# Patient Record
Sex: FEMALE | Race: OTHER | URBAN - METROPOLITAN AREA
[De-identification: names, ages, dates, MRNs, and addresses within clinical notes are randomized per-mention and may not be internally consistent; named-entity substitution may affect disease eponyms.]

---

## 2018-10-01 ENCOUNTER — EMERGENCY (EMERGENCY)
Facility: HOSPITAL | Age: 38
LOS: 0 days | Discharge: HOME | End: 2018-10-01
Attending: EMERGENCY MEDICINE | Admitting: EMERGENCY MEDICINE

## 2018-10-01 VITALS — WEIGHT: 160.06 LBS | HEIGHT: 62 IN

## 2018-10-01 VITALS
TEMPERATURE: 97 F | DIASTOLIC BLOOD PRESSURE: 61 MMHG | SYSTOLIC BLOOD PRESSURE: 131 MMHG | OXYGEN SATURATION: 100 % | HEART RATE: 86 BPM | RESPIRATION RATE: 19 BRPM

## 2018-10-01 DIAGNOSIS — R51 HEADACHE: ICD-10-CM

## 2018-10-01 DIAGNOSIS — D64.9 ANEMIA, UNSPECIFIED: ICD-10-CM

## 2018-10-01 LAB
ALBUMIN SERPL ELPH-MCNC: 4.5 G/DL — SIGNIFICANT CHANGE UP (ref 3.5–5.2)
ALP SERPL-CCNC: 70 U/L — SIGNIFICANT CHANGE UP (ref 30–115)
ALT FLD-CCNC: 11 U/L — SIGNIFICANT CHANGE UP (ref 0–41)
ANION GAP SERPL CALC-SCNC: 15 MMOL/L — HIGH (ref 7–14)
AST SERPL-CCNC: 15 U/L — SIGNIFICANT CHANGE UP (ref 0–41)
BASOPHILS # BLD AUTO: 0.06 K/UL — SIGNIFICANT CHANGE UP (ref 0–0.2)
BASOPHILS NFR BLD AUTO: 0.8 % — SIGNIFICANT CHANGE UP (ref 0–1)
BILIRUB SERPL-MCNC: 0.4 MG/DL — SIGNIFICANT CHANGE UP (ref 0.2–1.2)
BUN SERPL-MCNC: 11 MG/DL — SIGNIFICANT CHANGE UP (ref 10–20)
CALCIUM SERPL-MCNC: 9.2 MG/DL — SIGNIFICANT CHANGE UP (ref 8.5–10.1)
CHLORIDE SERPL-SCNC: 100 MMOL/L — SIGNIFICANT CHANGE UP (ref 98–110)
CO2 SERPL-SCNC: 24 MMOL/L — SIGNIFICANT CHANGE UP (ref 17–32)
CREAT SERPL-MCNC: 0.6 MG/DL — LOW (ref 0.7–1.5)
EOSINOPHIL # BLD AUTO: 0.07 K/UL — SIGNIFICANT CHANGE UP (ref 0–0.7)
EOSINOPHIL NFR BLD AUTO: 0.9 % — SIGNIFICANT CHANGE UP (ref 0–8)
GLUCOSE SERPL-MCNC: 89 MG/DL — SIGNIFICANT CHANGE UP (ref 70–99)
HCT VFR BLD CALC: 27.2 % — LOW (ref 37–47)
HGB BLD-MCNC: 7.5 G/DL — LOW (ref 12–16)
IMM GRANULOCYTES NFR BLD AUTO: 0.3 % — SIGNIFICANT CHANGE UP (ref 0.1–0.3)
LYMPHOCYTES # BLD AUTO: 2.99 K/UL — SIGNIFICANT CHANGE UP (ref 1.2–3.4)
LYMPHOCYTES # BLD AUTO: 39.2 % — SIGNIFICANT CHANGE UP (ref 20.5–51.1)
MCHC RBC-ENTMCNC: 18.9 PG — LOW (ref 27–31)
MCHC RBC-ENTMCNC: 27.6 G/DL — LOW (ref 32–37)
MCV RBC AUTO: 68.7 FL — LOW (ref 81–99)
MONOCYTES # BLD AUTO: 0.44 K/UL — SIGNIFICANT CHANGE UP (ref 0.1–0.6)
MONOCYTES NFR BLD AUTO: 5.8 % — SIGNIFICANT CHANGE UP (ref 1.7–9.3)
NEUTROPHILS # BLD AUTO: 4.05 K/UL — SIGNIFICANT CHANGE UP (ref 1.4–6.5)
NEUTROPHILS NFR BLD AUTO: 53 % — SIGNIFICANT CHANGE UP (ref 42.2–75.2)
NRBC # BLD: 0 /100 WBCS — SIGNIFICANT CHANGE UP (ref 0–0)
PLATELET # BLD AUTO: 597 K/UL — HIGH (ref 130–400)
POTASSIUM SERPL-MCNC: 4.1 MMOL/L — SIGNIFICANT CHANGE UP (ref 3.5–5)
POTASSIUM SERPL-SCNC: 4.1 MMOL/L — SIGNIFICANT CHANGE UP (ref 3.5–5)
PROT SERPL-MCNC: 7.3 G/DL — SIGNIFICANT CHANGE UP (ref 6–8)
RBC # BLD: 3.96 M/UL — LOW (ref 4.2–5.4)
RBC # FLD: 19.4 % — HIGH (ref 11.5–14.5)
SODIUM SERPL-SCNC: 139 MMOL/L — SIGNIFICANT CHANGE UP (ref 135–146)
WBC # BLD: 7.63 K/UL — SIGNIFICANT CHANGE UP (ref 4.8–10.8)
WBC # FLD AUTO: 7.63 K/UL — SIGNIFICANT CHANGE UP (ref 4.8–10.8)

## 2018-10-01 RX ORDER — METOCLOPRAMIDE HCL 10 MG
10 TABLET ORAL ONCE
Qty: 0 | Refills: 0 | Status: COMPLETED | OUTPATIENT
Start: 2018-10-01 | End: 2018-10-01

## 2018-10-01 RX ORDER — SODIUM CHLORIDE 9 MG/ML
1000 INJECTION, SOLUTION INTRAVENOUS ONCE
Qty: 0 | Refills: 0 | Status: COMPLETED | OUTPATIENT
Start: 2018-10-01 | End: 2018-10-01

## 2018-10-01 RX ADMIN — Medication 10 MILLIGRAM(S): at 10:03

## 2018-10-01 RX ADMIN — SODIUM CHLORIDE 2000 MILLILITER(S): 9 INJECTION, SOLUTION INTRAVENOUS at 10:03

## 2018-10-01 NOTE — ED PROVIDER NOTE - NS ED ROS FT
General: No fever/No chills +weakness  Eyes:  No visual changes, eye pain or discharge.  ENMT:  No hearing changes, pain, no sore throat or runny nose, no difficulty swallowing  Cardiac:  No chest pain, SOB or edema. No chest pain with exertion.  Respiratory:  No cough or respiratory distress. No hemoptysis. No history of asthma or RAD.  GI:  + nausea, - vomiting, diarrhea or abdominal pain.  :  No dysuria, frequency or burning.  MS:  No myalgia, muscle weakness, joint pain or back pain.  Neuro:  +headache or weakness.  No LOC.  Skin:  No skin rash.   Endocrine: No history of thyroid disease or diabetes.

## 2018-10-01 NOTE — ED ADULT NURSE NOTE - NSIMPLEMENTINTERV_GEN_ALL_ED
Implemented All Universal Safety Interventions:  [place] to call system. Call bell, personal items and telephone within reach. Instruct patient to call for assistance. Room bathroom lighting operational. Non-slip footwear when patient is off stretcher. Physically safe environment: no spills, clutter or unnecessary equipment. Stretcher in lowest position, wheels locked, appropriate side rails in place.

## 2018-10-01 NOTE — ED PROVIDER NOTE - PHYSICAL EXAMINATION
CONSTITUTIONAL: Well-developed; well-nourished; in no acute distress.   SKIN: warm, dry  HEAD: Normocephalic; atraumatic.  EYES: PERRL, EOMI, no conjunctival erythema, mucous membranes moist  ENT: No nasal discharge; airway clear  NECK: Supple; non tender.  CARD: S1, S2 normal; no murmurs, gallops, or rubs. Regular rate and rhythm.   RESP: No wheezes, rales or rhonchi.  ABD: soft ntnd  EXT: Normal ROM.  No clubbing, cyanosis or edema. .  NEURO: Alert, oriented, grossly unremarkable  PSYCH: Cooperative, appropriate.

## 2018-10-01 NOTE — ED PROVIDER NOTE - MEDICAL DECISION MAKING DETAILS
37 female with symptomatic anemia with headache likely nutritional cause due to prior surgery / lack of support will dispo to [facility] for transfusion and continued care 37 female with symptomatic anemia with headache likely nutritional cause due to prior surgery / lack of support will dispo to [facility] for transfusion and continued care.

## 2018-10-01 NOTE — ED PROVIDER NOTE - ATTENDING CONTRIBUTION TO CARE
I personally evaluated the patient. I reviewed the Resident’s or Physician Assistant’s note (as assigned above), and agree with the findings and plan except as documented in my note.     37 female here for headache x 2 days described as throbbing, worse with exertion, associated with fatigue, non resolving. Similar to prior when she was anemic.     PMH: anemia on no Rx / supplements  PSH: gastric bypass 10 years ago Dr. Keith with limited follow up    PE: female in no distress. HEENT: pale conjunctiva normal mucosa. CHEST: CTA bilateral. CV: pulses intact. SKIN: normal. NEURO: AAO 3 no focal deficits gait speech memory coordination normal.     Impression: anemia, headache    Plan: IV labs imaging supportive care and re-evaluation likely dispo to [facility].

## 2018-10-01 NOTE — ED PROVIDER NOTE - OBJECTIVE STATEMENT
37 y.o. F PMH s/p gastric bypass with chronic anemia 9y ago with 1 week of fatigue and 2 days of headache. She describes this headache as a throbbing headache diffusely, she feels this way when she has anemia. +headache/+fatigue/+N/-V/-F/-D/-dysuria/-vaginal bleeding. She usually receives Iron injections by Dr. Benoit but has not seen him in a few months.

## 2018-10-01 NOTE — ED PROVIDER NOTE - PROGRESS NOTE DETAILS
case d/w Dr. Del Rio Discussed Hemoglobin and the need for observation and transfusion. Pt refused transfusion and will follow up with her Hemotologist for iron tranfusion. Pt understands the risks and return precautions were given. Will Discharge. patient prefers outpatient care has follow up with Dr. Paredes for iron transfusions. Does not want to stay for transfusion. Understands the risks and benefits of her decision. 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 Has a plan of care.

## 2018-10-01 NOTE — ED ADULT NURSE NOTE - OBJECTIVE STATEMENT
Patient c/o Generalized HA with 1 episode of N/V x 4 day . Patient denies CP SOB visual changes fevers and chills.

## 2019-03-09 ENCOUNTER — INPATIENT (INPATIENT)
Facility: HOSPITAL | Age: 39
LOS: 2 days | Discharge: HOME | End: 2019-03-12
Attending: INTERNAL MEDICINE | Admitting: INTERNAL MEDICINE

## 2019-03-09 VITALS
HEIGHT: 62 IN | SYSTOLIC BLOOD PRESSURE: 128 MMHG | TEMPERATURE: 98 F | OXYGEN SATURATION: 100 % | WEIGHT: 154.98 LBS | HEART RATE: 109 BPM | RESPIRATION RATE: 20 BRPM | DIASTOLIC BLOOD PRESSURE: 73 MMHG

## 2019-03-09 PROBLEM — Z98.84 BARIATRIC SURGERY STATUS: Chronic | Status: ACTIVE | Noted: 2018-10-01

## 2019-03-09 PROBLEM — D64.9 ANEMIA, UNSPECIFIED: Chronic | Status: ACTIVE | Noted: 2018-10-01

## 2019-03-09 LAB
ABO RH CONFIRMATION: SIGNIFICANT CHANGE UP
ALBUMIN SERPL ELPH-MCNC: 3.7 G/DL — SIGNIFICANT CHANGE UP (ref 3.5–5.2)
ALP SERPL-CCNC: 85 U/L — SIGNIFICANT CHANGE UP (ref 30–115)
ALT FLD-CCNC: 38 U/L — SIGNIFICANT CHANGE UP (ref 0–41)
ANION GAP SERPL CALC-SCNC: 12 MMOL/L — SIGNIFICANT CHANGE UP (ref 7–14)
APTT BLD: 26.7 SEC — LOW (ref 27–39.2)
AST SERPL-CCNC: 19 U/L — SIGNIFICANT CHANGE UP (ref 0–41)
BILIRUB SERPL-MCNC: 0.2 MG/DL — SIGNIFICANT CHANGE UP (ref 0.2–1.2)
BLD GP AB SCN SERPL QL: SIGNIFICANT CHANGE UP
BUN SERPL-MCNC: 15 MG/DL — SIGNIFICANT CHANGE UP (ref 10–20)
CALCIUM SERPL-MCNC: 8.7 MG/DL — SIGNIFICANT CHANGE UP (ref 8.5–10.1)
CHLORIDE SERPL-SCNC: 106 MMOL/L — SIGNIFICANT CHANGE UP (ref 98–110)
CO2 SERPL-SCNC: 22 MMOL/L — SIGNIFICANT CHANGE UP (ref 17–32)
CREAT SERPL-MCNC: 0.5 MG/DL — LOW (ref 0.7–1.5)
GLUCOSE SERPL-MCNC: 100 MG/DL — HIGH (ref 70–99)
HCT VFR BLD CALC: 26.7 % — LOW (ref 37–47)
HGB BLD-MCNC: 8.2 G/DL — LOW (ref 12–16)
INR BLD: 1.14 RATIO — SIGNIFICANT CHANGE UP (ref 0.65–1.3)
LACTATE SERPL-SCNC: 1.1 MMOL/L — SIGNIFICANT CHANGE UP (ref 0.5–2.2)
MCHC RBC-ENTMCNC: 24.9 PG — LOW (ref 27–31)
MCHC RBC-ENTMCNC: 30.7 G/DL — LOW (ref 32–37)
MCV RBC AUTO: 81.2 FL — SIGNIFICANT CHANGE UP (ref 81–99)
NRBC # BLD: 0 /100 WBCS — SIGNIFICANT CHANGE UP (ref 0–0)
PLATELET # BLD AUTO: 311 K/UL — SIGNIFICANT CHANGE UP (ref 130–400)
POTASSIUM SERPL-MCNC: 4.4 MMOL/L — SIGNIFICANT CHANGE UP (ref 3.5–5)
POTASSIUM SERPL-SCNC: 4.4 MMOL/L — SIGNIFICANT CHANGE UP (ref 3.5–5)
PROT SERPL-MCNC: 6.5 G/DL — SIGNIFICANT CHANGE UP (ref 6–8)
PROTHROM AB SERPL-ACNC: 13.1 SEC — HIGH (ref 9.95–12.87)
RBC # BLD: 3.29 M/UL — LOW (ref 4.2–5.4)
RBC # FLD: 18.2 % — HIGH (ref 11.5–14.5)
SODIUM SERPL-SCNC: 140 MMOL/L — SIGNIFICANT CHANGE UP (ref 135–146)
TYPE + AB SCN PNL BLD: SIGNIFICANT CHANGE UP
WBC # BLD: 9.17 K/UL — SIGNIFICANT CHANGE UP (ref 4.8–10.8)
WBC # FLD AUTO: 9.17 K/UL — SIGNIFICANT CHANGE UP (ref 4.8–10.8)

## 2019-03-09 RX ORDER — PANTOPRAZOLE SODIUM 20 MG/1
8 TABLET, DELAYED RELEASE ORAL
Qty: 80 | Refills: 0 | Status: DISCONTINUED | OUTPATIENT
Start: 2019-03-09 | End: 2019-03-12

## 2019-03-09 RX ORDER — METOCLOPRAMIDE HCL 10 MG
10 TABLET ORAL ONCE
Qty: 0 | Refills: 0 | Status: DISCONTINUED | OUTPATIENT
Start: 2019-03-09 | End: 2019-03-09

## 2019-03-09 RX ORDER — ACETAMINOPHEN 500 MG
650 TABLET ORAL ONCE
Qty: 0 | Refills: 0 | Status: DISCONTINUED | OUTPATIENT
Start: 2019-03-09 | End: 2019-03-09

## 2019-03-09 RX ORDER — SODIUM CHLORIDE 9 MG/ML
1000 INJECTION, SOLUTION INTRAVENOUS
Qty: 0 | Refills: 0 | Status: DISCONTINUED | OUTPATIENT
Start: 2019-03-09 | End: 2019-03-10

## 2019-03-09 RX ORDER — PANTOPRAZOLE SODIUM 20 MG/1
80 TABLET, DELAYED RELEASE ORAL ONCE
Qty: 0 | Refills: 0 | Status: COMPLETED | OUTPATIENT
Start: 2019-03-09 | End: 2019-03-09

## 2019-03-09 RX ADMIN — PANTOPRAZOLE SODIUM 80 MILLIGRAM(S): 20 TABLET, DELAYED RELEASE ORAL at 19:50

## 2019-03-09 RX ADMIN — SODIUM CHLORIDE 125 MILLILITER(S): 9 INJECTION, SOLUTION INTRAVENOUS at 19:21

## 2019-03-09 RX ADMIN — PANTOPRAZOLE SODIUM 10 MG/HR: 20 TABLET, DELAYED RELEASE ORAL at 19:50

## 2019-03-09 NOTE — ED PROVIDER NOTE - PHYSICAL EXAMINATION
VITAL SIGNS: I have reviewed nursing notes and confirm.  CONSTITUTIONAL: pale but nontoxic appearing  SKIN: Pale, no rash  HEAD: Normocephalic; atraumatic.  EYES: PERRL, EOM intact; conjunctiva and sclera clear.  ENT: No nasal discharge; airway clear. TMs clear.  NECK: Supple; non tender.  CARD: S1, S2 normal, tachycardic  RESP: No wheezes, rales or rhonchi.  ABD: soft, NT, ND  RECTAL: no stool in vault; mucous with dark specks  EXT: Normal ROM. No clubbing, cyanosis or edema.  LYMPH: No acute cervical adenopathy.  NEURO: Alert, oriented. Grossly unremarkable. No focal deficits.  PSYCH: Cooperative, appropriate. VITAL SIGNS: I have reviewed nursing notes and confirm.  CONSTITUTIONAL: pale but nontoxic appearing  SKIN: Pale, no rash  HEAD: Normocephalic; atraumatic.  EYES: PERRL, EOM intact; conjunctiva and sclera clear.  ENT: throat erythematous, tonsils hypertrophic, exudate noted  NECK: Supple; non tender.  CARD: S1, S2 normal, tachycardic  RESP: No wheezes, rales or rhonchi.  ABD: soft, NT, ND  RECTAL: no stool in vault; mucous with dark specks  EXT: Normal ROM. No clubbing, cyanosis or edema.  LYMPH: No acute cervical adenopathy.  NEURO: Alert, oriented. Grossly unremarkable. No focal deficits.  PSYCH: Cooperative, appropriate.

## 2019-03-09 NOTE — ED PROVIDER NOTE - CLINICAL SUMMARY MEDICAL DECISION MAKING FREE TEXT BOX
Pt with gib, transfusion currently ongoing, GI aware, initially wanted to AMA after transfusion, but started having small brb with bm in ED and became nervous and now agreeable to admission

## 2019-03-09 NOTE — ED ADULT TRIAGE NOTE - CHIEF COMPLAINT QUOTE
2 dark bowel movements that started today, also has dizziness and lightheadedness, pt also states she has strep throat and is taking Augmentin

## 2019-03-09 NOTE — ED PROVIDER NOTE - OBJECTIVE STATEMENT
39yo woman remote h/o gastric bypass 10 years ago with Dr Keith with resultant anemia, previously requiring transfusion/iron infusion, presents with SOB, weakness/dizziness, palpitations on minimal exertion over the past few weeks. Pt notes that she had been getting iron infusions with Dr Paredes, but then her mother became ill (and  2 weeks ago) so she has felt overwhelmed and unable to keep up with her appts. Today, pt had 2 loose, black bowel movements without associated abdominal pain, nausea, vomiting. No gross blood, no prior episodes. She denies pain anywhere, just feels "anemic." She recalls an endoscopy several years ago where she was told that she "might have an early ulcer" but she was asymptomatic at the time so never followed up. 37yo woman remote h/o gastric bypass 10 years ago with Dr Keith with resultant anemia, previously requiring transfusion/iron infusion, presents with SOB, weakness/dizziness, palpitations on minimal exertion over the past few weeks. Pt notes that she had been getting iron infusions with Dr Paredes, but then her mother became ill (and  2 weeks ago) so she has felt overwhelmed and unable to keep up with her appts. Today, pt had 2 loose, black bowel movements without associated abdominal pain, nausea, vomiting. No gross blood, no prior episodes. Of note, she has been taking ibuprofen and amoxicillin for strep throat. She recalls an endoscopy several years ago where she was told that she "might have an early ulcer" but she was asymptomatic at the time so never followed up.

## 2019-03-09 NOTE — ED ADULT NURSE NOTE - OBJECTIVE STATEMENT
patient states has dizziness since 1400 today while folding laundry, states has hx of anemia and takes iron but hasn't had it in a while due to death in family. denies sob. patient looks pale

## 2019-03-09 NOTE — ED PROVIDER NOTE - PROGRESS NOTE DETAILS
Hb 8, will hold transfusion. Spoke with GI fellow, agrees with admission for serial Hb and endoscopy. Pt has 3 young children and is concerned about staying overnight (or until Monday, as endoscopy would likely not be done until then). She has seen an outside GI previously, so I paged her (Dr Latricia Woodson) to see if prompt followup for scope could be arranged. If so, pt would prefer to sign out AMA. Endorsed pt to Dr Waller to repeat Hb, transfuse (as pt is symptomatic and may sign out AMA, so could use a cushion), dispo accordingly. spoke to dr. pierre, agrees that pt should be admitted and scoped on monday, but if pt insisting on AMA, she will see her for scope tomorrow.  instructed to keep pt npo after midnight and to call office at 7am.  agrees that pt should be transfused before ama to stabilize pt before she is seen for scope tomorrow at dr. pierre's office.  pt informed of plan, agreed to 2U prbc transfusion and still wants to AMA and go to dr. pierre's office tomorrow. pt receiving transfusion, and had BRB BM, says not diarrhea.  pt now agrees to admission to medicine pt receiving transfusion, and had BRB BM, says not diarrhea.  no abd pain.  pt now agrees to admission to medicine.  endorsed to dr. menjivar and MAR

## 2019-03-09 NOTE — ED PROVIDER NOTE - NS ED ROS FT
Constitutional: (-) fever  Eyes/ENT: (-) blurry vision, (-) epistaxis  Cardiovascular: SOB on exertion, no chest pain  Respiratory: (-) cough, (-) shortness of breath  Gastrointestinal: see HPI  Musculoskeletal: (-) neck pain, (-) back pain, (-) joint pain  Integumentary: (-) rash, (-) edema  Neurological: (-) headache, (-) altered mental status  Psychiatric: (-) hallucinations  Allergic/Immunologic: (-) pruritus

## 2019-03-09 NOTE — ED ADULT NURSE REASSESSMENT NOTE - NS ED NURSE REASSESS COMMENT FT1
pt resting comfortably, denies any discomfort. no distress noted at this time. iv intact, safety maintained, receiving 1st unit PRBC.

## 2019-03-10 DIAGNOSIS — Z90.49 ACQUIRED ABSENCE OF OTHER SPECIFIED PARTS OF DIGESTIVE TRACT: Chronic | ICD-10-CM

## 2019-03-10 DIAGNOSIS — Z98.84 BARIATRIC SURGERY STATUS: Chronic | ICD-10-CM

## 2019-03-10 DIAGNOSIS — Z98.891 HISTORY OF UTERINE SCAR FROM PREVIOUS SURGERY: Chronic | ICD-10-CM

## 2019-03-10 LAB
ANION GAP SERPL CALC-SCNC: 7 MMOL/L — SIGNIFICANT CHANGE UP (ref 7–14)
BUN SERPL-MCNC: 9 MG/DL — LOW (ref 10–20)
CALCIUM SERPL-MCNC: 8.7 MG/DL — SIGNIFICANT CHANGE UP (ref 8.5–10.1)
CHLORIDE SERPL-SCNC: 107 MMOL/L — SIGNIFICANT CHANGE UP (ref 98–110)
CO2 SERPL-SCNC: 23 MMOL/L — SIGNIFICANT CHANGE UP (ref 17–32)
CREAT SERPL-MCNC: <0.5 MG/DL — LOW (ref 0.7–1.5)
GLUCOSE SERPL-MCNC: 87 MG/DL — SIGNIFICANT CHANGE UP (ref 70–99)
HCT VFR BLD CALC: 29.4 % — LOW (ref 37–47)
HCT VFR BLD CALC: 31.6 % — LOW (ref 37–47)
HGB BLD-MCNC: 9.4 G/DL — LOW (ref 12–16)
HGB BLD-MCNC: 9.9 G/DL — LOW (ref 12–16)
MCHC RBC-ENTMCNC: 24.9 PG — LOW (ref 27–31)
MCHC RBC-ENTMCNC: 25.3 PG — LOW (ref 27–31)
MCHC RBC-ENTMCNC: 31.3 G/DL — LOW (ref 32–37)
MCHC RBC-ENTMCNC: 32 G/DL — SIGNIFICANT CHANGE UP (ref 32–37)
MCV RBC AUTO: 79.2 FL — LOW (ref 81–99)
MCV RBC AUTO: 79.4 FL — LOW (ref 81–99)
NRBC # BLD: 0 /100 WBCS — SIGNIFICANT CHANGE UP (ref 0–0)
NRBC # BLD: 0 /100 WBCS — SIGNIFICANT CHANGE UP (ref 0–0)
PLATELET # BLD AUTO: 150 K/UL — SIGNIFICANT CHANGE UP (ref 130–400)
PLATELET # BLD AUTO: 241 K/UL — SIGNIFICANT CHANGE UP (ref 130–400)
POTASSIUM SERPL-MCNC: 4 MMOL/L — SIGNIFICANT CHANGE UP (ref 3.5–5)
POTASSIUM SERPL-SCNC: 4 MMOL/L — SIGNIFICANT CHANGE UP (ref 3.5–5)
RBC # BLD: 3.71 M/UL — LOW (ref 4.2–5.4)
RBC # BLD: 3.98 M/UL — LOW (ref 4.2–5.4)
RBC # FLD: 18.7 % — HIGH (ref 11.5–14.5)
RBC # FLD: 18.7 % — HIGH (ref 11.5–14.5)
SODIUM SERPL-SCNC: 137 MMOL/L — SIGNIFICANT CHANGE UP (ref 135–146)
WBC # BLD: 5.75 K/UL — SIGNIFICANT CHANGE UP (ref 4.8–10.8)
WBC # BLD: 5.84 K/UL — SIGNIFICANT CHANGE UP (ref 4.8–10.8)
WBC # FLD AUTO: 5.75 K/UL — SIGNIFICANT CHANGE UP (ref 4.8–10.8)
WBC # FLD AUTO: 5.84 K/UL — SIGNIFICANT CHANGE UP (ref 4.8–10.8)

## 2019-03-10 RX ORDER — CHLORHEXIDINE GLUCONATE 213 G/1000ML
1 SOLUTION TOPICAL
Qty: 0 | Refills: 0 | Status: DISCONTINUED | OUTPATIENT
Start: 2019-03-10 | End: 2019-03-12

## 2019-03-10 RX ADMIN — PANTOPRAZOLE SODIUM 10 MG/HR: 20 TABLET, DELAYED RELEASE ORAL at 17:06

## 2019-03-10 NOTE — CONSULT NOTE ADULT - SUBJECTIVE AND OBJECTIVE BOX
Patient is a 38y old  Female who presents with a chief complaint of Melena and dizziness      HPI:  37 yo F PMH of Gastric bypass in , hx of anemia s/p IV Fe infusions q week prn, hx of recent strep throat, treated with amoxicillin and  Motrin 600mg BID for 3 days presented to the ED with melena and dizziness. Afterwards patient had 2 x red blood mixed with stools last episode on 3/10 at 1 am. She currently feels better IN ED HR was 109, BP stable     Gi essie  egd essie  for pain and heartburn showing pud as per patient   no colonoscopy  FH mother had breast ca   usually has 1 nl bm per day       PAST MEDICAL & SURGICAL HISTORY:  Gastric bypass status for obesity  Anemia  H/O: : x3  S/P cholecystectomy  H/O gastric bypass      Home Medications:      MEDICATIONS  (STANDING):  chlorhexidine 4% Liquid 1 Application(s) Topical <User Schedule>  lactated ringers. 1000 milliLiter(s) (125 mL/Hr) IV Continuous <Continuous>  pantoprazole Infusion 8 mG/Hr (10 mL/Hr) IV Continuous <Continuous>    MEDICATIONS  (PRN):      Allergies    No Known Allergies    Intolerances        FAMILY HISTORY:  Family history of other heart disease (Father)  Family history of CHF (congestive heart failure) (Mother)  Family history of breast cancer (Mother)      SOCIAL    REVIEW OF SYSTEMS  General: mild fatigue   Skin: no rash   Ophtalmologic: no visual changes   Respiratory: no shortness of breath   Cardiovascular: no chest pain   Gastrointestinal: as per H&P   Genitourinary: no dysuria   Neurological: no weakness   otherwise as described above     Vital Signs Last 24 Hrs  T(C): 37.1 (10 Mar 2019 07:35), Max: 37.1 (10 Mar 2019 07:35)  T(F): 98.8 (10 Mar 2019 07:35), Max: 98.8 (10 Mar 2019 07:35)  HR: 18 (10 Mar 2019 07:35) (18 - 109)  BP: 108/50 (10 Mar 2019 07:35) (99/54 - 128/73)  BP(mean): --  RR: 18 (10 Mar 2019 07:35) (18 - 20)  SpO2: 97% (10 Mar 2019 07:35) (96% - 100%)    GENERAL:  no distress  SKIN: intact   HEENT:  pale conjonctiva/AT,  anicteric  CHEST:   no increased effort, breath sounds clear  HEART:  Regular rhythm  ABDOMEN:  Soft, mild tender, non-distended  EXTREMITIES:  no cyanosis  PSYCHIATRIC: normal affect       CBC Full  -  ( 09 Mar 2019 17:20 )  WBC Count : 9.17 K/uL  Hemoglobin : 8.2 g/dL  Hematocrit : 26.7 %  Platelet Count - Automated : 311 K/uL  Mean Cell Volume : 81.2 fL  Mean Cell Hemoglobin : 24.9 pg  Mean Cell Hemoglobin Concentration : 30.7 g/dL  Auto Neutrophil # : x  Auto Lymphocyte # : x  Auto Monocyte # : x  Auto Eosinophil # : x  Auto Basophil # : x  Auto Neutrophil % : x  Auto Lymphocyte % : x  Auto Monocyte % : x  Auto Eosinophil % : x  Auto Basophil % : x      Hemoglobin: 8.2 g/dL (19 @ 17:20)  Alanine Aminotransferase (ALT/SGPT): 38 U/L (19 @ 17:20)  Alkaline Phosphatase, Serum: 85 U/L (19 @ 17:20)  Bilirubin Total, Serum: 0.2 mg/dL (19 @ 17:20)  Aspartate Aminotransferase (AST/SGOT): 19 U/L (19 @ 17:20)  INR: 1.14 ratio (19 @ 17:20)      PT/INR - ( 09 Mar 2019 17:20 )   PT: 13.10 sec;   INR: 1.14 ratio         PTT - ( 09 Mar 2019 17:20 )  PTT:26.7 sec        140  |  106  |  15  ----------------------------<  100<H>  4.4   |  22  |  0.5<L>    Ca    8.7      09 Mar 2019 17:20    TPro  6.5  /  Alb  3.7  /  TBili  0.2  /  DBili  x   /  AST  19  /  ALT  38  /  AlkPhos  85                RADIOLOGY

## 2019-03-10 NOTE — PROGRESS NOTE ADULT - SUBJECTIVE AND OBJECTIVE BOX
HOSPITALIST ATTENDING NOTE    KARENA TORRES  38y Female  [id number]    INTERVAL HPI/OVERNIGHT EVENTS: melena, fresh red blood , epigastric pain    T(C): 37.1 (03-10-19 @ 07:35), Max: 37.1 (03-10-19 @ 07:35)  HR: 18 (03-10-19 @ 07:35) (18 - 109)  BP: 108/50 (03-10-19 @ 07:35) (99/54 - 128/73)  RR: 18 (03-10-19 @ 07:35) (18 - 20)  SpO2: 97% (03-10-19 @ 07:35) (96% - 100%)  Wt(kg): --      PHYSICAL EXAM:  GENERAL: NAD  EYES: EOMI, PERRLA, conjunctiva and sclera clear  ENMT: No tonsillar erythema, exudates, or enlargement  NECK: Supple, No JVD, Normal thyroid  NERVOUS SYSTEM:  Alert & Oriented X3, Good concentration; Non-focal- Motor Strength 5/5 B/L upper and lower extremities;  CHEST/LUNG: Clear to ascultation bilaterally; No rales, rhonchi, wheezing,   HEART: Regular rate and rhythm; No murmurs, rubs, or gallops  ABDOMEN: Soft, epigastric tenderness to palp, Nondistended; Bowel sounds present  EXTREMITIES: No clubbing, cyanosis, or edema  LYMPH: No lymphadenopathy noted  SKIN: No rashes or lesions  PSYCH: No suicidal/homicial ideation/hallucinations    Consultant(s) Notes Reviewed:  [x ] YES  [ ] NO  Care Discussed with Consultants/Other Providers/ Housestaff [ x] YES  [ ] NO    LABS:                        8.2    9.17  )-----------( 311      ( 09 Mar 2019 17:20 )             26.7     03-09    140  |  106  |  15  ----------------------------<  100<H>  4.4   |  22  |  0.5<L>    Ca    8.7      09 Mar 2019 17:20    TPro  6.5  /  Alb  3.7  /  TBili  0.2  /  DBili  x   /  AST  19  /  ALT  38  /  AlkPhos  85  03-09          RADIOLOGY & ADDITIONAL TESTS:    Imaging or report Personally Reviewed:  [ ] YES  [ ] NO    Case discussed with resident    Care discussed with pt/family      HEALTH ISSUES - PROBLEM Dx:

## 2019-03-10 NOTE — CONSULT NOTE ADULT - ASSESSMENT
37 yo F PMH of Gastric bypass in 2009, hx of anemia s/p IV Fe infusions q week prn, hx of recent strep throat, treated with amoxicillin and  Motrin 600mg BID for 3 days presented to the ED with melena and dizziness. Afterwards patient had 2 x red blood mixed with stools last episode on 3/10 at 1 am. She currently feels better IN ED HR was 109, BP stable     1- Acute blood loss anemia  likely related to NSAIDs use in the setting of gastric bypass surgery. Currently no signs of active GI bleed and patient is hemodynamically stable, will keep active T&S, cbc q12, 2 x 18 gauge iv, PPI drip, avoid NSAIDs, s/p 2 u PRBCs, transfuse as needed, will plan for egd

## 2019-03-10 NOTE — H&P ADULT - ASSESSMENT
Assessment:  ·	Active Upper GI bleed  ·	hx Gastric bypass  ·	hx of anemia on occasional iron infusion    plan:  ·	2 x 18G ivs. Protonix drip, NPO  ·	Volume resuscitation with fluids and blood products as needed  ·	Serial CBC  ·	GI eval again after FBPR  ·	ICU upgrade for hemodynamic monitoring. Approved by Dr Lawrence  ·	Orthostatics. Fall precautions  ·	DVT ppx: SCDs.  ·	Full code. From home.

## 2019-03-10 NOTE — H&P ADULT - NSHPLABSRESULTS_GEN_ALL_CORE
8.2    9.17  )-----------( 311      ( 09 Mar 2019 17:20 )             26.7     03-09    140  |  106  |  15  ----------------------------<  100<H>  4.4   |  22  |  0.5<L>    Ca    8.7      09 Mar 2019 17:20    TPro  6.5  /  Alb  3.7  /  TBili  0.2  /  DBili  x   /  AST  19  /  ALT  38  /  AlkPhos  85  03-09

## 2019-03-10 NOTE — H&P ADULT - FAMILY HISTORY
Mother  Still living? Unknown  Family history of breast cancer, Age at diagnosis: Age Unknown  Family history of CHF (congestive heart failure), Age at diagnosis: Age Unknown     Father  Still living? Unknown  Family history of other heart disease, Age at diagnosis: Age Unknown

## 2019-03-10 NOTE — H&P ADULT - NSHPPHYSICALEXAM_GEN_ALL_CORE
Pale, non toxic appearing  AAOx3, grossly intact motor power and sensorium  Soft non tender abdomen +BS  GBBS  RRR, normal S1S2, heart rate around 84/minute  TRACIE melenic blood tinger  no LE edema

## 2019-03-10 NOTE — PROGRESS NOTE ADULT - ASSESSMENT
37 yo F PMH of Gastric bypass in , hx of anemia s/p IV infusions every year?, hx of recent strep throat, treated with amocillin and  Motrin 600mg BID for 3 days presented to the ED with weakness lethargy and very dark black stool that started at 2 pm yesterday. She reports that she had never had melena before. She denies any abdominal pain, fever, trauma to the belly, previous GI bleed. She had an endoscopy few years ago that was unremarkable as per the patient. She has no family history of bleeding diathesis. Patient is not taking antiplatelets or anticoagulation. In the ED she was tachycardic. Rectal exam showed melena. Patient was found to have a hemoglobin of 8.2. She received fluids and 2 units of packed red blood cells. She was requesting to leave AMA as she has children at home and ED doctor found her a doctor that does endoscopies on Sundays but she went to the bathroom in the ED. She had fresh blood per rectum as well as blood clots. GI team contact before fresh blood per rectum and advised to do protonix drip and will schedule patient for EGD. Reports regular menses and no heaving bleeding except last on last cycle.Past Medical History:  Anemia    Gastric bypass status for obesity.    Past Surgical History:  H/O gastric bypass    H/O:   x3  S/P cholecystectomy.      #upper GI bleed - npo , IV protonix drip, - spoke with GI fellow - EGD today or tomorrow - will round soon  pt is hemodynamically stable  started as melena - no w fresh blood - brisk bleed possible    #acute on chronic anemia - basline between 7-8 - received IV iron in past post gastric bypass    #hx of gastric bypass - possible ulcer disease post surgery - NSAID exacerbated     #strep phayrngitis - on augmentin - resume when able to take oral (only took 3 days tx)    spoke to resident - pt is upgraded to ICU for closer monitoring

## 2019-03-11 ENCOUNTER — RESULT REVIEW (OUTPATIENT)
Age: 39
End: 2019-03-11

## 2019-03-11 ENCOUNTER — TRANSCRIPTION ENCOUNTER (OUTPATIENT)
Age: 39
End: 2019-03-11

## 2019-03-11 LAB
HCT VFR BLD CALC: 28 % — LOW (ref 37–47)
HCT VFR BLD CALC: 30 % — LOW (ref 37–47)
HGB BLD-MCNC: 8.8 G/DL — LOW (ref 12–16)
HGB BLD-MCNC: 9.5 G/DL — LOW (ref 12–16)
MCHC RBC-ENTMCNC: 24.9 PG — LOW (ref 27–31)
MCHC RBC-ENTMCNC: 24.9 PG — LOW (ref 27–31)
MCHC RBC-ENTMCNC: 31.4 G/DL — LOW (ref 32–37)
MCHC RBC-ENTMCNC: 31.7 G/DL — LOW (ref 32–37)
MCV RBC AUTO: 78.7 FL — LOW (ref 81–99)
MCV RBC AUTO: 79.3 FL — LOW (ref 81–99)
NRBC # BLD: 0 /100 WBCS — SIGNIFICANT CHANGE UP (ref 0–0)
NRBC # BLD: 0 /100 WBCS — SIGNIFICANT CHANGE UP (ref 0–0)
PLATELET # BLD AUTO: 248 K/UL — SIGNIFICANT CHANGE UP (ref 130–400)
PLATELET # BLD AUTO: 271 K/UL — SIGNIFICANT CHANGE UP (ref 130–400)
RBC # BLD: 3.53 M/UL — LOW (ref 4.2–5.4)
RBC # BLD: 3.81 M/UL — LOW (ref 4.2–5.4)
RBC # FLD: 17.9 % — HIGH (ref 11.5–14.5)
RBC # FLD: 18.3 % — HIGH (ref 11.5–14.5)
TYPE + AB SCN PNL BLD: SIGNIFICANT CHANGE UP
WBC # BLD: 5.26 K/UL — SIGNIFICANT CHANGE UP (ref 4.8–10.8)
WBC # BLD: 5.64 K/UL — SIGNIFICANT CHANGE UP (ref 4.8–10.8)
WBC # FLD AUTO: 5.26 K/UL — SIGNIFICANT CHANGE UP (ref 4.8–10.8)
WBC # FLD AUTO: 5.64 K/UL — SIGNIFICANT CHANGE UP (ref 4.8–10.8)

## 2019-03-11 RX ORDER — SOD SULF/SODIUM/NAHCO3/KCL/PEG
4000 SOLUTION, RECONSTITUTED, ORAL ORAL ONCE
Qty: 0 | Refills: 0 | Status: DISCONTINUED | OUTPATIENT
Start: 2019-03-11 | End: 2019-03-11

## 2019-03-11 RX ORDER — SOD SULF/SODIUM/NAHCO3/KCL/PEG
2000 SOLUTION, RECONSTITUTED, ORAL ORAL ONCE
Qty: 0 | Refills: 0 | Status: DISCONTINUED | OUTPATIENT
Start: 2019-03-11 | End: 2019-03-11

## 2019-03-11 RX ORDER — INFLUENZA VIRUS VACCINE 15; 15; 15; 15 UG/.5ML; UG/.5ML; UG/.5ML; UG/.5ML
0.5 SUSPENSION INTRAMUSCULAR ONCE
Qty: 0 | Refills: 0 | Status: COMPLETED | OUTPATIENT
Start: 2019-03-11 | End: 2019-03-11

## 2019-03-11 RX ORDER — SODIUM CHLORIDE 9 MG/ML
1000 INJECTION, SOLUTION INTRAVENOUS
Qty: 0 | Refills: 0 | Status: DISCONTINUED | OUTPATIENT
Start: 2019-03-11 | End: 2019-03-12

## 2019-03-11 RX ORDER — SOD SULF/SODIUM/NAHCO3/KCL/PEG
2000 SOLUTION, RECONSTITUTED, ORAL ORAL ONCE
Qty: 0 | Refills: 0 | Status: COMPLETED | OUTPATIENT
Start: 2019-03-11 | End: 2019-03-11

## 2019-03-11 RX ORDER — SOD SULF/SODIUM/NAHCO3/KCL/PEG
1 SOLUTION, RECONSTITUTED, ORAL ORAL
Qty: 1 | Refills: 0 | OUTPATIENT
Start: 2019-03-11 | End: 2019-03-11

## 2019-03-11 RX ADMIN — SODIUM CHLORIDE 75 MILLILITER(S): 9 INJECTION, SOLUTION INTRAVENOUS at 17:06

## 2019-03-11 RX ADMIN — Medication 10 MILLIGRAM(S): at 18:46

## 2019-03-11 RX ADMIN — PANTOPRAZOLE SODIUM 10 MG/HR: 20 TABLET, DELAYED RELEASE ORAL at 00:43

## 2019-03-11 RX ADMIN — PANTOPRAZOLE SODIUM 10 MG/HR: 20 TABLET, DELAYED RELEASE ORAL at 06:30

## 2019-03-11 RX ADMIN — PANTOPRAZOLE SODIUM 10 MG/HR: 20 TABLET, DELAYED RELEASE ORAL at 17:07

## 2019-03-11 RX ADMIN — Medication 10 MILLIGRAM(S): at 21:29

## 2019-03-11 RX ADMIN — SODIUM CHLORIDE 75 MILLILITER(S): 9 INJECTION, SOLUTION INTRAVENOUS at 08:26

## 2019-03-11 RX ADMIN — Medication 2000 MILLILITER(S): at 18:55

## 2019-03-11 NOTE — CONSULT NOTE ADULT - SUBJECTIVE AND OBJECTIVE BOX
Patient is a 38y old  Female who presents with a chief complaint of Melena, fresh blood per rectum, and dizziness (10 Mar 2019 14:58)      HPI:  39 yo F PMH of Gastric bypass in , hx of anemia s/p IV tin infusions every year?, hx of recent strep throat, treated with amoxicillin and  Motrin 600mg BID for 3 days presented to the ED with weakness lethargy and very dark black stool that started at 2 pm yesterday. She reports that she had never had melena before. She denies any abdominal pain, fever, trauma to the belly, previous GI bleed. She had an endoscopy few years ago that was unremarkable as per the patient. She has no family history of bleeding diathesis. Patient is not taking antiplatelets or anticoagulation. In the ED she was tachycardic. Rectal exam showed melena. Patient was found to have a hemoglobin of 8.2. She received fluids and 2 units of packed red blood cells. She was requesting to leave AMA as she has children at home and ED doctor found her a doctor that does endoscopies on Sundays but she went to the bathroom in the ED. She had fresh blood per rectum as well as blood clots. GI team contact before fresh blood per rectum and advised to do protonix drip and will schedule patient for EGD. Reports regular menses and no heaving bleeding except last on last cycle. (10 Mar 2019 03:20)in ED 8.2 HB S/P 2 UNIT prbc, s/p gi eval, no EGD      PAST MEDICAL & SURGICAL HISTORY:  Gastric bypass status for obesity  Anemia  H/O: : x3  S/P cholecystectomy  H/O gastric bypass      SOCIAL HX:   Smoking  -    FAMILY HISTORY:  Family history of other heart disease (Father)  Family history of CHF (congestive heart failure) (Mother)  Family history of breast cancer (Mother)      REVIEW OF SYSTEMS SEE HPI          Allergies    No Known Allergies    Intolerances        chlorhexidine 4% Liquid 1 Application(s) Topical <User Schedule>  pantoprazole Infusion 8 mG/Hr IV Continuous <Continuous>  : Home Meds:      PHYSICAL EXAM    ICU Vital Signs Last 24 Hrs  T(C): 36.7 (11 Mar 2019 05:37), Max: 37.1 (10 Mar 2019 07:35)  T(F): 98.1 (11 Mar 2019 05:37), Max: 98.8 (10 Mar 2019 07:35)  HR: 91 (11 Mar 2019 05:37) (18 - 91)  BP: 121/58 (11 Mar 2019 05:37) (95/53 - 121/58)  RR: 18 (11 Mar 2019 05:37) (18 - 20)  SpO2: 99% (11 Mar 2019 05:37) (97% - 99%)      General:  HEENT:  Pale, comfotrable              Lymph Nodes: No cervical LN   Lungs: Bilateral BS  Cardiovascular: Regular  Abdomen: Soft, Positive BS  Extremities: No clubbing  Skin: Warm  Neurological: Non focal         LABS:                          9.9    5.75  )-----------( 150      ( 10 Mar 2019 21:02 )             31.6                                               03-10    137  |  107  |  9<L>  ----------------------------<  87  4.0   |  23  |  <0.5<L>    Ca    8.7      10 Mar 2019 14:30    TPro  6.5  /  Alb  3.7  /  TBili  0.2  /  DBili  x   /  AST  19  /  ALT  38  /  AlkPhos  85  03-09      PT/INR - ( 09 Mar 2019 17:20 )   PT: 13.10 sec;   INR: 1.14 ratio         PTT - ( 09 Mar 2019 17:20 )  PTT:26.7 sec                                                                                     LIVER FUNCTIONS - ( 09 Mar 2019 17:20 )  Alb: 3.7 g/dL / Pro: 6.5 g/dL / ALK PHOS: 85 U/L / ALT: 38 U/L / AST: 19 U/L / GGT: x                                                                                                                                       X-Rays    reviewed    MEDICATIONS  (STANDING):  chlorhexidine 4% Liquid 1 Application(s) Topical <User Schedule>  pantoprazole Infusion 8 mG/Hr (10 mL/Hr) IV Continuous <Continuous>    MEDICATIONS  (PRN):

## 2019-03-11 NOTE — CONSULT NOTE ADULT - ASSESSMENT
IMPRESSION: GI BLEED, S.P GASTRIC BYPASS WAS ON MOTRIN FOR 3 DAYS, S/P 2 UNIT OF PRBC NO PRIOR BLEED, STABLE HEMODYNAMICALLY LAST BM MORE THAN 24 H      PLAN:    CNS: AVOID CNS DEPRESSANT    HEENT:  Oral care    PULMONARY:  HOB @ 45 degrees    CARDIOVASCULAR: 75 CC LR / H    GI: GI prophylaxis                                          Feeding NPO PROTONIX DRIP    RENAL:  F/u  lytes.  Correct as needed. accurate I/O    INFECTIOUS DISEASE: NO ABX    HEMATOLOGICAL:  DVT prophylaxis. SERIAL CBC    ENDOCRINE:  Follow up FS.  Insulin protocol if needed.      CODE STATUS: FULL CODE    DISPOSITION: POSSIBLE DOWNGRADE DEPENDING ON EGD

## 2019-03-11 NOTE — CHART NOTE - NSCHARTNOTEFT_GEN_A_CORE
39 yo F PMH of Gastric bypass in 2009, hx of anemia s/p IV tin infusions every year?, hx of recent strep throat, treated with amoxicillin and  Motrin 600mg BID for 3 days presented to the ED with weakness lethargy and very dark black stool that started at 2 pm yesterday. She reports that she had never had melena before. She denies any abdominal pain, fever, trauma to the belly, previous GI bleed. She had an endoscopy few years ago that was unremarkable as per the patient. She has no family history of bleeding diathesis. Patient is not taking antiplatelets or anticoagulation. In the ED she was tachycardic. Rectal exam showed melena. Patient was found to have a hemoglobin of 8.2. She received fluids and 2 units of packed red blood cells. She was requesting to leave AMA as she has children at home and ED doctor found her a doctor that does endoscopies on Sundays but she went to the bathroom in the ED. She had fresh blood per rectum as well as blood clots. GI team contact before fresh blood per rectum and advised to do protonix drip and will schedule patient for EGD. Reports regular menses and no heaving bleeding except last on last cycle. (10 Mar 2019 03:20)in ED 8.2 HB S/P 2 UNIT prbc, s/p gi eval & EGD                          8.8    5.26  )-----------( 248      ( 11 Mar 2019 09:05 )             28.0     03-10    137  |  107  |  9<L>  ----------------------------<  87  4.0   |  23  |  <0.5<L>    Ca    8.7      10 Mar 2019 14:30    TPro  6.5  /  Alb  3.7  /  TBili  0.2  /  DBili  x   /  AST  19  /  ALT  38  /  AlkPhos  85  03-09    MEDICATIONS  (STANDING):  bisacodyl 10 milliGRAM(s) Oral every 2 hours  chlorhexidine 4% Liquid 1 Application(s) Topical <User Schedule>  lactated ringers. 1000 milliLiter(s) (75 mL/Hr) IV Continuous <Continuous>  pantoprazole Infusion 8 mG/Hr (10 mL/Hr) IV Continuous <Continuous>  polyethylene glycol/electrolyte Solution 2000 milliLiter(s) Oral once    MEDICATIONS  (PRN):      GI BLEED, S.P GASTRIC BYPASS WAS ON MOTRIN FOR 3 DAYS, S/P 2 UNIT OF PRBC NO PRIOR BLEED, STABLE HEMODYNAMICALLY LAST BM MORE THAN 24 H  EGD: clean based ulcer, no active bleed  Scheduled for colonoscopy tomorrow.  F/u CBC 11AM and 8PM  Type and screen active 39 yo F PMH of Gastric bypass in 2009, hx of anemia s/p IV tin infusions every year?, hx of recent strep throat, treated with amoxicillin and  Motrin 600mg BID for 3 days presented to the ED with weakness lethargy and very dark black stool that started at 2 pm yesterday. She reports that she had never had melena before. She denies any abdominal pain, fever, trauma to the belly, previous GI bleed. She had an endoscopy few years ago that was unremarkable as per the patient. She has no family history of bleeding diathesis. Patient is not taking antiplatelets or anticoagulation. In the ED she was tachycardic. Rectal exam showed melena. Patient was found to have a hemoglobin of 8.2. She received fluids and 2 units of packed red blood cells. She was requesting to leave AMA as she has children at home and ED doctor found her a doctor that does endoscopies on Sundays but she went to the bathroom in the ED. She had fresh blood per rectum as well as blood clots. GI team contact before fresh blood per rectum and advised to do protonix drip and will schedule patient for EGD. Reports regular menses and no heaving bleeding except last on last cycle. (10 Mar 2019 03:20)in ED 8.2 HB S/P 2 UNIT prbc, s/p gi eval & EGD                            9.5    5.64  )-----------( 271      ( 11 Mar 2019 12:20 )             30.0                           8.8    5.26  )-----------( 248      ( 11 Mar 2019 09:05 )             28.0     03-10    137  |  107  |  9<L>  ----------------------------<  87  4.0   |  23  |  <0.5<L>    Ca    8.7      10 Mar 2019 14:30    TPro  6.5  /  Alb  3.7  /  TBili  0.2  /  DBili  x   /  AST  19  /  ALT  38  /  AlkPhos  85  03-09    MEDICATIONS  (STANDING):  bisacodyl 10 milliGRAM(s) Oral every 2 hours  chlorhexidine 4% Liquid 1 Application(s) Topical <User Schedule>  lactated ringers. 1000 milliLiter(s) (75 mL/Hr) IV Continuous <Continuous>  pantoprazole Infusion 8 mG/Hr (10 mL/Hr) IV Continuous <Continuous>  polyethylene glycol/electrolyte Solution 2000 milliLiter(s) Oral once    MEDICATIONS  (PRN):      GI BLEED, S.P GASTRIC BYPASS WAS ON MOTRIN FOR 3 DAYS, S/P 2 UNIT OF PRBC NO PRIOR BLEED, STABLE HEMODYNAMICALLY LAST BM MORE THAN 24 H  EGD: clean based ulcer, no active bleed  Scheduled for colonoscopy tomorrow.  Hb stable  F/u CBC 8PM  Type and screen active

## 2019-03-11 NOTE — PROGRESS NOTE ADULT - SUBJECTIVE AND OBJECTIVE BOX
KARENA TORRES  38y  Female      Patient is a 38y old  Female who presents with a chief complaint of Melena, fresh blood per rectum, and dizziness (11 Mar 2019 07:12)      INTERVAL HPI/OVERNIGHT EVENTS:  She feels better,     Vital Signs Last 24 Hrs  T(C): 36.5 (11 Mar 2019 10:58), Max: 37 (11 Mar 2019 00:05)  T(F): 97.7 (11 Mar 2019 07:30), Max: 98.6 (11 Mar 2019 00:05)  HR: 78 (11 Mar 2019 12:00) (69 - 91)  BP: 109/67 (11 Mar 2019 12:00) (95/53 - 121/58)  BP(mean): --  RR: 18 (11 Mar 2019 12:00) (18 - 20)  SpO2: 99% (11 Mar 2019 10:58) (98% - 99%)            Consultant(s) Notes Reviewed:  [x ] YES  [ ] NO          MEDICATIONS  (STANDING):  bisacodyl 10 milliGRAM(s) Oral every 2 hours  chlorhexidine 4% Liquid 1 Application(s) Topical <User Schedule>  lactated ringers. 1000 milliLiter(s) (75 mL/Hr) IV Continuous <Continuous>  pantoprazole Infusion 8 mG/Hr (10 mL/Hr) IV Continuous <Continuous>  polyethylene glycol/electrolyte Solution 2000 milliLiter(s) Oral once    MEDICATIONS  (PRN):      LABS                          9.5    5.64  )-----------( 271      ( 11 Mar 2019 12:20 )             30.0     03-10    137  |  107  |  9<L>  ----------------------------<  87  4.0   |  23  |  <0.5<L>    Ca    8.7      10 Mar 2019 14:30    TPro  6.5  /  Alb  3.7  /  TBili  0.2  /  DBili  x   /  AST  19  /  ALT  38  /  AlkPhos  85  03-09        PT/INR - ( 09 Mar 2019 17:20 )   PT: 13.10 sec;   INR: 1.14 ratio         PTT - ( 09 Mar 2019 17:20 )  PTT:26.7 sec  Lactate Trend  03-09 @ 17:20 Lactate:1.1         CAPILLARY BLOOD GLUCOSE            RADIOLOGY & ADDITIONAL TESTS:    Imaging Personally Reviewed:  [ ] YES  [ ] NO    HEALTH ISSUES - PROBLEM Dx:        PHYSICAL EXAM:  GENERAL: NAD, well-developed  HEAD:  Atraumatic, Normocephalic  EYES: EOMI, PERRLA, conjunctiva and sclera clear  NECK: Supple, No JVD  CHEST/LUNG: Clear to auscultation bilaterally; No wheeze  HEART: Regular rate and rhythm; S1 S2  ABDOMEN: Soft, Nontender, Nondistended; Bowel sounds present  EXTREMITIES:  2+ Peripheral Pulses, No clubbing, cyanosis, or edema  PSYCH: AAOx3  NEUROLOGY: non-focal  SKIN: No rashes or lesions

## 2019-03-11 NOTE — PROGRESS NOTE ADULT - ASSESSMENT
37 yo female with PMH of Gastric bypass in 2009, iron deficiency anemia presented to the ED with melena and dizziness for 3 days. Afterwards patient had 2 x red blood mixed with stools last episode on 3/10 at 1 am. She was recently treated for Strep pharyngitis with Augmentin and Motrin.     A/P:   Acute Upper GI bleeding:   s/p two RBCs transfusion  s/p EGD today showed 2 cm clean based anastomotic ulcer, no active bleeding  Plan for Colonoscopy tomorrow due to bloody stool.   Continue Protonix, may switch from drip to IV BID.     Acute blood loss anemia on chronic iron deficiency anemia:   s/p 2 RBCs transfusion.   H/H stable.     Hx of gastric bypass:       Acute Pharyngitis   on Augmentin - resume when able to take oral (only took 3 days tx)    #Progress Note Handoff:  Pending (specify):  Colonoscopy tomorrow, daily CBC  Family discussion: n/A  Disposition: Home once medically

## 2019-03-12 ENCOUNTER — TRANSCRIPTION ENCOUNTER (OUTPATIENT)
Age: 39
End: 2019-03-12

## 2019-03-12 VITALS — DIASTOLIC BLOOD PRESSURE: 49 MMHG | SYSTOLIC BLOOD PRESSURE: 108 MMHG | RESPIRATION RATE: 18 BRPM | HEART RATE: 87 BPM

## 2019-03-12 LAB
ANION GAP SERPL CALC-SCNC: 14 MMOL/L — SIGNIFICANT CHANGE UP (ref 7–14)
BASOPHILS # BLD AUTO: 0.02 K/UL — SIGNIFICANT CHANGE UP (ref 0–0.2)
BASOPHILS NFR BLD AUTO: 0.3 % — SIGNIFICANT CHANGE UP (ref 0–1)
BUN SERPL-MCNC: 6 MG/DL — LOW (ref 10–20)
CALCIUM SERPL-MCNC: 9 MG/DL — SIGNIFICANT CHANGE UP (ref 8.5–10.1)
CHLORIDE SERPL-SCNC: 107 MMOL/L — SIGNIFICANT CHANGE UP (ref 98–110)
CO2 SERPL-SCNC: 21 MMOL/L — SIGNIFICANT CHANGE UP (ref 17–32)
CREAT SERPL-MCNC: 0.5 MG/DL — LOW (ref 0.7–1.5)
EOSINOPHIL # BLD AUTO: 0.12 K/UL — SIGNIFICANT CHANGE UP (ref 0–0.7)
EOSINOPHIL NFR BLD AUTO: 2 % — SIGNIFICANT CHANGE UP (ref 0–8)
GLUCOSE SERPL-MCNC: 67 MG/DL — LOW (ref 70–99)
HCT VFR BLD CALC: 32.3 % — LOW (ref 37–47)
HGB BLD-MCNC: 9.9 G/DL — LOW (ref 12–16)
IMM GRANULOCYTES NFR BLD AUTO: 0.5 % — HIGH (ref 0.1–0.3)
LYMPHOCYTES # BLD AUTO: 2.58 K/UL — SIGNIFICANT CHANGE UP (ref 1.2–3.4)
LYMPHOCYTES # BLD AUTO: 43.4 % — SIGNIFICANT CHANGE UP (ref 20.5–51.1)
MCHC RBC-ENTMCNC: 24.6 PG — LOW (ref 27–31)
MCHC RBC-ENTMCNC: 30.7 G/DL — LOW (ref 32–37)
MCV RBC AUTO: 80.3 FL — LOW (ref 81–99)
MONOCYTES # BLD AUTO: 0.4 K/UL — SIGNIFICANT CHANGE UP (ref 0.1–0.6)
MONOCYTES NFR BLD AUTO: 6.7 % — SIGNIFICANT CHANGE UP (ref 1.7–9.3)
NEUTROPHILS # BLD AUTO: 2.8 K/UL — SIGNIFICANT CHANGE UP (ref 1.4–6.5)
NEUTROPHILS NFR BLD AUTO: 47.1 % — SIGNIFICANT CHANGE UP (ref 42.2–75.2)
NRBC # BLD: 0 /100 WBCS — SIGNIFICANT CHANGE UP (ref 0–0)
PLATELET # BLD AUTO: 274 K/UL — SIGNIFICANT CHANGE UP (ref 130–400)
POTASSIUM SERPL-MCNC: 3.7 MMOL/L — SIGNIFICANT CHANGE UP (ref 3.5–5)
POTASSIUM SERPL-SCNC: 3.7 MMOL/L — SIGNIFICANT CHANGE UP (ref 3.5–5)
RBC # BLD: 4.02 M/UL — LOW (ref 4.2–5.4)
RBC # FLD: 17.5 % — HIGH (ref 11.5–14.5)
SODIUM SERPL-SCNC: 142 MMOL/L — SIGNIFICANT CHANGE UP (ref 135–146)
SURGICAL PATHOLOGY STUDY: SIGNIFICANT CHANGE UP
WBC # BLD: 5.95 K/UL — SIGNIFICANT CHANGE UP (ref 4.8–10.8)
WBC # FLD AUTO: 5.95 K/UL — SIGNIFICANT CHANGE UP (ref 4.8–10.8)

## 2019-03-12 RX ORDER — PANTOPRAZOLE SODIUM 20 MG/1
1 TABLET, DELAYED RELEASE ORAL
Qty: 60 | Refills: 0 | OUTPATIENT
Start: 2019-03-12 | End: 2019-04-10

## 2019-03-12 RX ORDER — SUCRALFATE 1 G
1 TABLET ORAL EVERY 12 HOURS
Qty: 0 | Refills: 0 | Status: DISCONTINUED | OUTPATIENT
Start: 2019-03-12 | End: 2019-03-12

## 2019-03-12 RX ORDER — IRON SUCROSE 20 MG/ML
200 INJECTION, SOLUTION INTRAVENOUS EVERY 24 HOURS
Qty: 0 | Refills: 0 | Status: DISCONTINUED | OUTPATIENT
Start: 2019-03-12 | End: 2019-03-12

## 2019-03-12 RX ORDER — PANTOPRAZOLE SODIUM 20 MG/1
40 TABLET, DELAYED RELEASE ORAL EVERY 12 HOURS
Qty: 0 | Refills: 0 | Status: DISCONTINUED | OUTPATIENT
Start: 2019-03-12 | End: 2019-03-12

## 2019-03-12 RX ADMIN — PANTOPRAZOLE SODIUM 10 MG/HR: 20 TABLET, DELAYED RELEASE ORAL at 05:24

## 2019-03-12 RX ADMIN — SODIUM CHLORIDE 75 MILLILITER(S): 9 INJECTION, SOLUTION INTRAVENOUS at 05:24

## 2019-03-12 RX ADMIN — IRON SUCROSE 110 MILLIGRAM(S): 20 INJECTION, SOLUTION INTRAVENOUS at 14:55

## 2019-03-12 NOTE — PROGRESS NOTE ADULT - ASSESSMENT
<<<RESIDENT DISCHARGE NOTE>>>     KARENA TORRES  MRN-[id number]    VITAL SIGNS:  T(F): 96.2 (03-12-19 @ 11:05), Max: 98.7 (03-11-19 @ 17:02)  HR: 87 (03-12-19 @ 13:50)  BP: 108/49 (03-12-19 @ 13:50)  SpO2: 100% (03-12-19 @ 13:28)  Weight (kg): 70.3 (03-12-19 @ 12:12)  BMI (kg/m2): 28.3 (03-12-19 @ 12:12)    PHYSICAL EXAMINATION:  General: NAD, sitting up in bed, eating.   Head & Neck: wnl  Pulmonary: CTABL  Cardiovascular: Regular rate and rhythm  Gastrointestinal/Abdomen & Pelvis: Abdomen soft, non-tender, non-distended  Neurologic/Motor: AAOX3    TEST RESULTS:                        9.9    5.95  )-----------( 274      ( 11 Mar 2019 23:09 )             32.3       03-11    142  |  107  |  6<L>  ----------------------------<  67<L>  3.7   |  21  |  0.5<L>    Ca    9.0      11 Mar 2019 23:09        FINAL DISCHARGE INTERVIEW:  Resident(s) Present: (Name:_____Laurent________), RN Present: (Name:  ___________)    DISCHARGE MEDICATION RECONCILIATION  reviewed with Attending (Name:____Dr. Del Toro_______)    DISPOSITION:   [ X ] Home,    [  ] Home with Visiting Nursing Services,   [    ]  SNF/ NH,    [   ] Acute Rehab (4A),   [   ] Other (Specify:_________)

## 2019-03-12 NOTE — DISCHARGE NOTE PROVIDER - HOSPITAL COURSE
39 y/o F s/p gastric bypass on recent NSAIDS presented with weakness, lethargy, and dark stools on the day ptp, with an episode of BRBPR in ED. Pt received 2 U PRBCs, and hemoglobin was stable. Patient underwent EGD which showed a 2 cm clean based anastamotic ulcer without bleeding. Pt underwent colonoscopy which showed normal colon, and internal and external hemorrhoids. At this time, patient is stable for discharge.

## 2019-03-12 NOTE — DISCHARGE NOTE PROVIDER - PROVIDER TOKENS
FREE:[LAST:[[place]],FIRST:[Alexis],PHONE:[phone]],FAX:[(   )    -],ADDRESS:[[address] [place] [address], [place] [address]]]

## 2019-03-12 NOTE — DISCHARGE NOTE PROVIDER - NSDCCPCAREPLAN_GEN_ALL_CORE_FT
PRINCIPAL DISCHARGE DIAGNOSIS  Problem: GI bleed  Assessment and Plan of Treatment: Please continue taking protonix twice daily, every 12 hours. Please follow up with your primary care doctor within 2 weeks of discharge.      SECONDARY DISCHARGE DIAGNOSES  Problem: Symptomatic anemia  Assessment and Plan of Treatment: Please follow up with your primary care doctor within 2 weeks of discharge.

## 2019-03-12 NOTE — DISCHARGE NOTE PROVIDER - NSFOLLOWUPCLINICS_GEN_ALL_ED_FT
[facility]    [address], [place]   Phone: [phone]  Fax:   Follow Up Time: 7-10 Days

## 2019-03-12 NOTE — PROGRESS NOTE ADULT - SUBJECTIVE AND OBJECTIVE BOX
S: no complaints of any GI bleeding, feels well    O:    Vital Signs Last 24 Hrs  T(C): 35.7 (12 Mar 2019 11:05), Max: 37.1 (11 Mar 2019 17:02)  T(F): 96.2 (12 Mar 2019 11:05), Max: 98.7 (11 Mar 2019 17:02)  HR: 78 (12 Mar 2019 11:05) (73 - 91)  BP: 102/51 (12 Mar 2019 11:05) (96/60 - 115/69)  BP(mean): --  RR: 18 (12 Mar 2019 11:05) (18 - 18)  SpO2: 97% (12 Mar 2019 11:05) (97% - 97%)      PHYSICAL EXAM:  GENERAL: NAD, well-developed  HEAD:  Atraumatic, Normocephalic  EYES: EOMI, PERRLA, conjunctiva and sclera clear  NECK: Supple, No JVD  CHEST/LUNG: Clear to auscultation bilaterally; No wheeze  HEART: Regular rate and rhythm; S1 S2  ABDOMEN: Soft, Nontender, Nondistended; Bowel sounds present  EXTREMITIES:  2+ Peripheral Pulses, No clubbing, cyanosis, or edema  PSYCH: AAOx3  NEUROLOGY: non-focal  SKIN: No rashes or lesions      LABS                          9.9    5.95  )-----------( 274      ( 11 Mar 2019 23:09 )             32.3     03-11    142  |  107  |  6<L>  ----------------------------<  67<L>  3.7   |  21  |  0.5<L>    Ca    9.0      11 Mar 2019 23:09

## 2019-03-12 NOTE — DISCHARGE NOTE NURSING/CASE MANAGEMENT/SOCIAL WORK - NSDCDPATPORTLINK_GEN_ALL_CORE
You can access the [facility] Patient Portal, offered by [facility], by registering with the following website: [email or web address]

## 2019-03-12 NOTE — DISCHARGE NOTE PROVIDER - CARE PROVIDER_API CALL
Alexis Woodson  [address], [place] [address], [place] [address]  Phone: [phone]  Fax: (   )    -  Follow Up Time:

## 2019-03-12 NOTE — PROGRESS NOTE ADULT - REASON FOR ADMISSION
Melena, fresh blood per rectum, and dizziness

## 2019-03-12 NOTE — PROGRESS NOTE ADULT - ASSESSMENT
38 F w Gastric bypass in 2009, iron deficiency anemia presented to the ED with melena and dizziness for 3 days. Afterwards patient had 2 x red blood mixed with stools last episode on 3/10 at 1 am. She was recently treated for Strep pharyngitis with Augmentin and Motrin.     Acute Upper GI bleeding:   s/p two RBCs transfusion  s/p EGD today showed 2 cm clean based anastomotic ulcer, no active bleeding  Plan for Colonoscopy today  Continue Protonix   Can Dispo once cleared from GI team standpoint    #Acute blood loss anemia on chronic iron deficiency anemia:   s/p 2 RBCs transfusion.   H/H stable.      #Acute Pharyngitis   on Augmentin - resume when able to take oral (only took 3 days tx), to finish preplanned course    #Progress Note Handoff:  Pending (specify):  Colonoscopy today  Family discussion: n/A  Disposition: Home once cleared from GI standpoint 38 F w Gastric bypass in 2009, iron deficiency anemia presented to the ED with melena and dizziness for 3 days. Afterwards patient had 2 x red blood mixed with stools last episode on 3/10 at 1 am. She was recently treated for Strep pharyngitis with Augmentin and Motrin.     Acute Upper GI bleeding:   s/p two RBCs transfusion  s/p EGD today showed 2 cm clean based anastomotic ulcer, no active bleeding  Plan for Colonoscopy today  Continue Protonix   Can Dispo once cleared from GI team standpoint    #Acute blood loss anemia on chronic iron deficiency anemia:   s/p 2 RBCs transfusion.   H/H stable.      #Acute Pharyngitis   on Augmentin - resume when able to take oral (only took 3 days tx), to finish preplanned course    #Progress Note Handoff:  Pending (specify):  Colonoscopy today  Family discussion: n/A  Disposition: Home once cleared from GI standpoint    ADDENDUM:  GI cleared pt, and pt can be dc home today on PPI

## 2019-03-12 NOTE — DISCHARGE NOTE PROVIDER - NSDCCPTREATMENT_GEN_ALL_CORE_FT
PRINCIPAL PROCEDURE  Procedure: Colonoscopy  Findings and Treatment: Findings:   Protruding lesions Internal and external hemorrhoids were noted.   Additional findings Otherwise normal colon. Plan: Resume previous diet  Patient can be discharged home on Protonix 40 BID        SECONDARY PROCEDURE  Procedure: EGD  Findings and Treatment: Impressions:    Normal mucosa in the whole esophagus.    A 2 cm clean based anastomotic ulcer was noted. No bleeding noted (Biopsy).    Normal Efferent and Afferent loops.

## 2019-03-15 DIAGNOSIS — K64.8 OTHER HEMORRHOIDS: ICD-10-CM

## 2019-03-15 DIAGNOSIS — D50.9 IRON DEFICIENCY ANEMIA, UNSPECIFIED: ICD-10-CM

## 2019-03-15 DIAGNOSIS — Z98.84 BARIATRIC SURGERY STATUS: ICD-10-CM

## 2019-03-15 DIAGNOSIS — K25.9 GASTRIC ULCER, UNSPECIFIED AS ACUTE OR CHRONIC, WITHOUT HEMORRHAGE OR PERFORATION: ICD-10-CM

## 2019-03-15 DIAGNOSIS — Z79.2 LONG TERM (CURRENT) USE OF ANTIBIOTICS: ICD-10-CM

## 2019-03-15 DIAGNOSIS — J02.0 STREPTOCOCCAL PHARYNGITIS: ICD-10-CM

## 2019-03-15 DIAGNOSIS — K92.2 GASTROINTESTINAL HEMORRHAGE, UNSPECIFIED: ICD-10-CM

## 2019-03-15 DIAGNOSIS — Z90.49 ACQUIRED ABSENCE OF OTHER SPECIFIED PARTS OF DIGESTIVE TRACT: ICD-10-CM

## 2019-03-15 DIAGNOSIS — R00.0 TACHYCARDIA, UNSPECIFIED: ICD-10-CM

## 2019-03-15 DIAGNOSIS — K92.1 MELENA: ICD-10-CM

## 2019-03-15 DIAGNOSIS — D62 ACUTE POSTHEMORRHAGIC ANEMIA: ICD-10-CM

## 2019-03-15 DIAGNOSIS — R42 DIZZINESS AND GIDDINESS: ICD-10-CM

## 2019-03-15 DIAGNOSIS — K64.4 RESIDUAL HEMORRHOIDAL SKIN TAGS: ICD-10-CM

## 2019-03-15 DIAGNOSIS — Z79.1 LONG TERM (CURRENT) USE OF NON-STEROIDAL ANTI-INFLAMMATORIES (NSAID): ICD-10-CM

## 2020-12-22 NOTE — DISCHARGE NOTE PROVIDER - NSFOLLOWUPCLINICSTOKEN_GEN_ALL_ED_FT
Luis Kapoor  ORTHOPAEDIC SURGERY  [address], [address]  [place] [address]  Phone: [phone]  Fax: [phone]  Scheduled Appointment: 12/24/2020  
[id number]:7-10 Days;

## 2021-01-21 ENCOUNTER — TRANSCRIPTION ENCOUNTER (OUTPATIENT)
Age: 41
End: 2021-01-21

## 2022-02-09 NOTE — ED PROVIDER NOTE - PROGRESS NOTE ADDITIONAL1
What Type Of Note Output Would You Prefer (Optional)?: Standard Output How Severe Is Your Skin Lesion?: moderate Has Your Skin Lesion Been Treated?: not been treated Is This A New Presentation, Or A Follow-Up?: Skin Lesions Additional Progress Note...

## 2022-03-03 PROBLEM — Z00.00 ENCOUNTER FOR PREVENTIVE HEALTH EXAMINATION: Status: ACTIVE | Noted: 2022-03-03

## 2022-03-24 ENCOUNTER — APPOINTMENT (OUTPATIENT)
Dept: OBGYN | Facility: CLINIC | Age: 42
End: 2022-03-24

## 2022-03-29 ENCOUNTER — TRANSCRIPTION ENCOUNTER (OUTPATIENT)
Age: 42
End: 2022-03-29

## 2022-04-04 ENCOUNTER — TRANSCRIPTION ENCOUNTER (OUTPATIENT)
Age: 42
End: 2022-04-04

## 2022-05-17 ENCOUNTER — APPOINTMENT (OUTPATIENT)
Dept: OBGYN | Facility: CLINIC | Age: 42
End: 2022-05-17

## 2022-06-09 ENCOUNTER — TRANSCRIPTION ENCOUNTER (OUTPATIENT)
Age: 42
End: 2022-06-09

## 2022-06-09 ENCOUNTER — EMERGENCY (EMERGENCY)
Facility: HOSPITAL | Age: 42
LOS: 0 days | Discharge: HOME | End: 2022-06-10
Attending: EMERGENCY MEDICINE | Admitting: EMERGENCY MEDICINE
Payer: COMMERCIAL

## 2022-06-09 VITALS
RESPIRATION RATE: 20 BRPM | HEIGHT: 62 IN | SYSTOLIC BLOOD PRESSURE: 121 MMHG | DIASTOLIC BLOOD PRESSURE: 79 MMHG | HEART RATE: 97 BPM | TEMPERATURE: 98 F | OXYGEN SATURATION: 99 % | WEIGHT: 160.06 LBS

## 2022-06-09 DIAGNOSIS — Z98.84 BARIATRIC SURGERY STATUS: ICD-10-CM

## 2022-06-09 DIAGNOSIS — Z98.891 HISTORY OF UTERINE SCAR FROM PREVIOUS SURGERY: Chronic | ICD-10-CM

## 2022-06-09 DIAGNOSIS — R10.11 RIGHT UPPER QUADRANT PAIN: ICD-10-CM

## 2022-06-09 DIAGNOSIS — Z98.84 BARIATRIC SURGERY STATUS: Chronic | ICD-10-CM

## 2022-06-09 DIAGNOSIS — N83.209 UNSPECIFIED OVARIAN CYST, UNSPECIFIED SIDE: ICD-10-CM

## 2022-06-09 DIAGNOSIS — Z90.49 ACQUIRED ABSENCE OF OTHER SPECIFIED PARTS OF DIGESTIVE TRACT: ICD-10-CM

## 2022-06-09 DIAGNOSIS — Z90.49 ACQUIRED ABSENCE OF OTHER SPECIFIED PARTS OF DIGESTIVE TRACT: Chronic | ICD-10-CM

## 2022-06-09 PROCEDURE — 99285 EMERGENCY DEPT VISIT HI MDM: CPT

## 2022-06-09 RX ORDER — SODIUM CHLORIDE 9 MG/ML
2000 INJECTION, SOLUTION INTRAVENOUS ONCE
Refills: 0 | Status: COMPLETED | OUTPATIENT
Start: 2022-06-09 | End: 2022-06-09

## 2022-06-09 RX ORDER — DIATRIZOATE MEGLUMINE 180 MG/ML
30 INJECTION, SOLUTION INTRAVESICAL ONCE
Refills: 0 | Status: COMPLETED | OUTPATIENT
Start: 2022-06-09 | End: 2022-06-10

## 2022-06-09 NOTE — ED ADULT NURSE NOTE - NSICDXFAMILYHX_GEN_ALL_CORE_FT
FAMILY HISTORY:  Father  Still living? Unknown  Family history of other heart disease, Age at diagnosis: Age Unknown    Mother  Still living? Unknown  Family history of breast cancer, Age at diagnosis: Age Unknown  Family history of CHF (congestive heart failure), Age at diagnosis: Age Unknown

## 2022-06-10 VITALS
OXYGEN SATURATION: 98 % | HEART RATE: 96 BPM | DIASTOLIC BLOOD PRESSURE: 78 MMHG | RESPIRATION RATE: 20 BRPM | SYSTOLIC BLOOD PRESSURE: 128 MMHG | TEMPERATURE: 98 F

## 2022-06-10 LAB
ALBUMIN SERPL ELPH-MCNC: 4.5 G/DL — SIGNIFICANT CHANGE UP (ref 3.5–5.2)
ALP SERPL-CCNC: 80 U/L — SIGNIFICANT CHANGE UP (ref 30–115)
ALT FLD-CCNC: 16 U/L — SIGNIFICANT CHANGE UP (ref 0–41)
ANION GAP SERPL CALC-SCNC: 11 MMOL/L — SIGNIFICANT CHANGE UP (ref 7–14)
APPEARANCE UR: ABNORMAL
AST SERPL-CCNC: 18 U/L — SIGNIFICANT CHANGE UP (ref 0–41)
BACTERIA # UR AUTO: ABNORMAL
BASOPHILS # BLD AUTO: 0.04 K/UL — SIGNIFICANT CHANGE UP (ref 0–0.2)
BASOPHILS NFR BLD AUTO: 0.5 % — SIGNIFICANT CHANGE UP (ref 0–1)
BILIRUB SERPL-MCNC: 0.8 MG/DL — SIGNIFICANT CHANGE UP (ref 0.2–1.2)
BILIRUB UR-MCNC: NEGATIVE — SIGNIFICANT CHANGE UP
BUN SERPL-MCNC: 10 MG/DL — SIGNIFICANT CHANGE UP (ref 10–20)
CALCIUM SERPL-MCNC: 9.2 MG/DL — SIGNIFICANT CHANGE UP (ref 8.5–10.1)
CHLORIDE SERPL-SCNC: 102 MMOL/L — SIGNIFICANT CHANGE UP (ref 98–110)
CO2 SERPL-SCNC: 24 MMOL/L — SIGNIFICANT CHANGE UP (ref 17–32)
COLOR SPEC: ABNORMAL
CREAT SERPL-MCNC: 0.6 MG/DL — LOW (ref 0.7–1.5)
DIFF PNL FLD: ABNORMAL
EGFR: 116 ML/MIN/1.73M2 — SIGNIFICANT CHANGE UP
EOSINOPHIL # BLD AUTO: 0.07 K/UL — SIGNIFICANT CHANGE UP (ref 0–0.7)
EOSINOPHIL NFR BLD AUTO: 1 % — SIGNIFICANT CHANGE UP (ref 0–8)
EPI CELLS # UR: ABNORMAL /HPF
GLUCOSE SERPL-MCNC: 87 MG/DL — SIGNIFICANT CHANGE UP (ref 70–99)
GLUCOSE UR QL: NEGATIVE MG/DL — SIGNIFICANT CHANGE UP
HCG SERPL QL: NEGATIVE — SIGNIFICANT CHANGE UP
HCT VFR BLD CALC: 39.1 % — SIGNIFICANT CHANGE UP (ref 37–47)
HGB BLD-MCNC: 13 G/DL — SIGNIFICANT CHANGE UP (ref 12–16)
IMM GRANULOCYTES NFR BLD AUTO: 0.1 % — SIGNIFICANT CHANGE UP (ref 0.1–0.3)
KETONES UR-MCNC: ABNORMAL
LACTATE SERPL-SCNC: 0.7 MMOL/L — SIGNIFICANT CHANGE UP (ref 0.7–2)
LEUKOCYTE ESTERASE UR-ACNC: ABNORMAL
LIDOCAIN IGE QN: 39 U/L — SIGNIFICANT CHANGE UP (ref 7–60)
LYMPHOCYTES # BLD AUTO: 2.66 K/UL — SIGNIFICANT CHANGE UP (ref 1.2–3.4)
LYMPHOCYTES # BLD AUTO: 36.3 % — SIGNIFICANT CHANGE UP (ref 20.5–51.1)
MCHC RBC-ENTMCNC: 31.3 PG — HIGH (ref 27–31)
MCHC RBC-ENTMCNC: 33.2 G/DL — SIGNIFICANT CHANGE UP (ref 32–37)
MCV RBC AUTO: 94.2 FL — SIGNIFICANT CHANGE UP (ref 81–99)
MONOCYTES # BLD AUTO: 0.54 K/UL — SIGNIFICANT CHANGE UP (ref 0.1–0.6)
MONOCYTES NFR BLD AUTO: 7.4 % — SIGNIFICANT CHANGE UP (ref 1.7–9.3)
NEUTROPHILS # BLD AUTO: 4.01 K/UL — SIGNIFICANT CHANGE UP (ref 1.4–6.5)
NEUTROPHILS NFR BLD AUTO: 54.7 % — SIGNIFICANT CHANGE UP (ref 42.2–75.2)
NITRITE UR-MCNC: NEGATIVE — SIGNIFICANT CHANGE UP
NRBC # BLD: 0 /100 WBCS — SIGNIFICANT CHANGE UP (ref 0–0)
PH UR: 7 — SIGNIFICANT CHANGE UP (ref 5–8)
PLATELET # BLD AUTO: 296 K/UL — SIGNIFICANT CHANGE UP (ref 130–400)
POTASSIUM SERPL-MCNC: 4.4 MMOL/L — SIGNIFICANT CHANGE UP (ref 3.5–5)
POTASSIUM SERPL-SCNC: 4.4 MMOL/L — SIGNIFICANT CHANGE UP (ref 3.5–5)
PROT SERPL-MCNC: 7.1 G/DL — SIGNIFICANT CHANGE UP (ref 6–8)
PROT UR-MCNC: 30 MG/DL
RBC # BLD: 4.15 M/UL — LOW (ref 4.2–5.4)
RBC # FLD: 11.9 % — SIGNIFICANT CHANGE UP (ref 11.5–14.5)
RBC CASTS # UR COMP ASSIST: >50 /HPF
SARS-COV-2 RNA SPEC QL NAA+PROBE: SIGNIFICANT CHANGE UP
SODIUM SERPL-SCNC: 137 MMOL/L — SIGNIFICANT CHANGE UP (ref 135–146)
SP GR SPEC: 1.02 — SIGNIFICANT CHANGE UP (ref 1.01–1.03)
UROBILINOGEN FLD QL: 0.2 MG/DL — SIGNIFICANT CHANGE UP
WBC # BLD: 7.33 K/UL — SIGNIFICANT CHANGE UP (ref 4.8–10.8)
WBC # FLD AUTO: 7.33 K/UL — SIGNIFICANT CHANGE UP (ref 4.8–10.8)
WBC UR QL: SIGNIFICANT CHANGE UP /HPF

## 2022-06-10 PROCEDURE — 76705 ECHO EXAM OF ABDOMEN: CPT | Mod: 26

## 2022-06-10 PROCEDURE — 74177 CT ABD & PELVIS W/CONTRAST: CPT | Mod: 26,MA

## 2022-06-10 RX ORDER — KETOROLAC TROMETHAMINE 30 MG/ML
30 SYRINGE (ML) INJECTION ONCE
Refills: 0 | Status: DISCONTINUED | OUTPATIENT
Start: 2022-06-10 | End: 2022-06-10

## 2022-06-10 RX ADMIN — DIATRIZOATE MEGLUMINE 30 MILLILITER(S): 180 INJECTION, SOLUTION INTRAVESICAL at 00:46

## 2022-06-10 RX ADMIN — Medication 30 MILLIGRAM(S): at 00:38

## 2022-06-10 RX ADMIN — SODIUM CHLORIDE 2000 MILLILITER(S): 9 INJECTION, SOLUTION INTRAVENOUS at 00:38

## 2022-06-10 NOTE — ED PROVIDER NOTE - CARE PROVIDER_API CALL
Sandhya Koenig (MD)  Gastroenterology  [address] [place] [address]  Phone: [phone]  Fax: [phone]  Follow Up Time: 1-3 Days

## 2022-06-10 NOTE — ED PROVIDER NOTE - CLINICAL SUMMARY MEDICAL DECISION MAKING FREE TEXT BOX
41y female PMH gastric bypass (Ferzli), cholecystectomy, ovarian cyst with 24 hours RUQ pain, no n/v/d, no f/c, no change in urine/stool, currently menstruating, no urinary symptoms, on exam vital signs appreciated, well appearing conj pink sclera anicteric cor reg lungs cta abd +bs, soft with minimal ruq ttp no g/r, no cvat, labs and studies reviewed and d/w patient whose symptoms have resolved, sono report appreciated, as pain resolved and LFTs/lipase normal do not suspect obstructing stone, aware she must f/u with GI given radiographic concern for choledocholithiasis, also gyn for cyst. Patient counseled regarding conditions which should prompt return.

## 2022-06-10 NOTE — ED PROVIDER NOTE - NS ED ROS FT
Constitutional:  No fevers or chills.  Eyes:  No visual changes, eye pain, or discharge.  ENT:  No hearing changes. No sore throat.  Neck:  No neck pain or stiffness.  Cardiac:  No CP or edema.  Resp:  No cough or SOB. No hemoptysis.   GI:  No nausea, vomiting, diarrhea, (+) abdominal pain.  :  No dysuria, frequency, or hematuria.  MSK:  No myalgias or joint pain/swelling.  Neuro:  No headache, dizziness, or weakness.  Skin:  No skin rash.

## 2022-06-10 NOTE — ED PROVIDER NOTE - OBJECTIVE STATEMENT
41y female with PMH Choly Gastric Bypass  presents with RUQ pain started today not associated with food. no other exacerbating or alleviating factors  Denies HA, dizziness, weakness, fever, cough, CP, SOB, palpitations, N/V, Leg swelling, dysuria, hematuria, dark or bloody stool.

## 2022-06-10 NOTE — ED PROVIDER NOTE - PHYSICAL EXAMINATION
PHYSICAL EXAM: I have reviewed current vital signs.  GENERAL: NAD, well-nourished; well-developed.  HEAD:  Normocephalic, atraumatic.  EYES: EOMI, PERRL, conjunctiva and sclera clear.  ENT: MMM, no erythema/exudates.  NECK: Supple, no JVD.  CHEST/LUNG: Clear to auscultation bilaterally; no wheezes, rales, or rhonchi.  HEART: Regular rate and rhythm, normal S1 and S2; no murmurs, rubs, or gallops.  ABDOMEN: Soft, RUQ abd pain, nondistended.  EXTREMITIES:  2+ peripheral pulses; no clubbing, cyanosis, or edema.  PSYCH: Cooperative, appropriate, normal mood and affect.  NEUROLOGY: A&O x 3. Motor 5/5. Sensory intact. No focal neurological deficits. CN II - XII intact. (-) dysmetria, facial droop, pronator drift.  SKIN: Warm and dry.

## 2022-06-10 NOTE — ED PROVIDER NOTE - NSFOLLOWUPINSTRUCTIONS_ED_ALL_ED_FT
Our Emergency Department Referral Coordinators will be reaching out to you in the next 24-48 hours from 9:00am to 5:00pm (Monday - Friday) with a follow up appointment. Please expect a phone call from the hospital in that time frame. If you do not receive a call or if you have any questions or concerns, you can reach them at [phone] or [phone].      Abdominal Pain    Many things can cause abdominal pain. Usually, abdominal pain is not caused by a disease and will improve without treatment. Your health care provider will do a physical exam and possibly order blood tests and imaging to help determine the seriousness of your pain. However, in many cases, no cause may be found and you may need further testing as an outpatient. Monitor your abdominal pain for any changes.     SEEK IMMEDIATE MEDICAL CARE IF YOU HAVE THE FOLLOWING SYMPTOMS: worsening abdominal pain, vomiting, diarrhea, inability to have bowel movements or pass gas, black or bloody stool, fever accompanying chest pain or back pain, or dizziness/lightheadedness.

## 2022-06-10 NOTE — ED PROVIDER NOTE - CARE PLAN
1 Principal Discharge DX:	Abdominal pain  Secondary Diagnosis:	Common bile duct stone   Principal Discharge DX:	Abdominal pain

## 2022-06-10 NOTE — ED PROVIDER NOTE - PATIENT PORTAL LINK FT
You can access the FollowMyHealth Patient Portal offered by [facility] by registering at the following website: [email or web address]. By joining [employer]’s FollowMyHealth portal, you will also be able to view your health information using other applications (apps) compatible with our system.

## 2022-06-11 LAB
CULTURE RESULTS: NO GROWTH — SIGNIFICANT CHANGE UP
SPECIMEN SOURCE: SIGNIFICANT CHANGE UP

## 2022-07-01 ENCOUNTER — APPOINTMENT (OUTPATIENT)
Dept: GASTROENTEROLOGY | Facility: CLINIC | Age: 42
End: 2022-07-01

## 2022-09-20 ENCOUNTER — NON-APPOINTMENT (OUTPATIENT)
Age: 42
End: 2022-09-20

## 2022-11-03 ENCOUNTER — NON-APPOINTMENT (OUTPATIENT)
Age: 42
End: 2022-11-03

## 2022-12-20 ENCOUNTER — NON-APPOINTMENT (OUTPATIENT)
Age: 42
End: 2022-12-20

## 2023-02-01 NOTE — H&P ADULT - HISTORY OF PRESENT ILLNESS
37 yo F PMH of Gastric bypass in 2009, hx of anemia s/p IV infusions every year?, hx of recent strep throat, treated with amocillin and  Motrin 600mg BID for 3 days presented to the ED with weakness lethargy and very dark black stool that started at 2 pm yesterday. She reports that she had never had melena before. She denies any abdominal pain, fever, trauma to the belly, previous GI bleed. She had an endoscopy few years ago that was unremarkable as per the patient. She has no family history of bleeding diathesis. Patient is not taking antiplatelets or anticoagulation. In the ED she was tachycardic. Rectal exam showed melena. Patient was found to have a hemoglobin of 8.2. She received fluids and 2 units of packed red blood cells. She was requesting to leave AMA as she has children at home and ED doctor found her a doctor that does endoscopies on Sundays but she went to the bathroom in the ED. She had fresh blood per rectum as well as blood clots. GI team contact before fresh blood per rectum and advised to do protonix drip and will schedule patient for EGD. Reports regular menses and no heaving bleeding except last on last cycle.
MEDICATIONS  (STANDING):  aspirin enteric coated 81 milliGRAM(s) Oral daily  atorvastatin 80 milliGRAM(s) Oral at bedtime  buPROPion XL (24-Hour) . 150 milliGRAM(s) Oral daily  clonazePAM  Tablet 2 milliGRAM(s) Oral at bedtime  influenza  Vaccine (HIGH DOSE) 0.7 milliLiter(s) IntraMuscular once  levothyroxine 112 MICROGram(s) Oral daily  metoprolol tartrate 25 milliGRAM(s) Oral daily  pantoprazole    Tablet 40 milliGRAM(s) Oral before breakfast  PARoxetine 40 milliGRAM(s) Oral daily  ticagrelor 60 milliGRAM(s) Oral every 12 hours    MEDICATIONS  (PRN):  diphenhydrAMINE Injectable 50 milliGRAM(s) IntraMuscular every 6 hours PRN EPS  haloperidol    Injectable 5 milliGRAM(s) IntraMuscular every 6 hours PRN severe psychiotic agitation  LORazepam   Injectable 2 milliGRAM(s) IntraMuscular every 4 hours PRN severe agitation

## 2023-03-20 ENCOUNTER — LABORATORY RESULT (OUTPATIENT)
Age: 43
End: 2023-03-20

## 2023-03-21 RX ORDER — SULFAMETHOXAZOLE AND TRIMETHOPRIM 800; 160 MG/1; MG/1
800-160 TABLET ORAL
Qty: 14 | Refills: 0 | Status: COMPLETED | COMMUNITY
Start: 2023-03-21 | End: 2023-03-28

## 2023-03-27 LAB — BACTERIA UR CULT: ABNORMAL

## 2023-08-06 ENCOUNTER — EMERGENCY (EMERGENCY)
Facility: HOSPITAL | Age: 43
LOS: 0 days | Discharge: HOME | End: 2023-08-06
Attending: EMERGENCY MEDICINE
Payer: COMMERCIAL

## 2023-08-06 VITALS
RESPIRATION RATE: 18 BRPM | HEART RATE: 89 BPM | OXYGEN SATURATION: 99 % | DIASTOLIC BLOOD PRESSURE: 73 MMHG | SYSTOLIC BLOOD PRESSURE: 117 MMHG | HEIGHT: 61 IN | WEIGHT: 141.98 LBS

## 2023-08-06 VITALS
OXYGEN SATURATION: 98 % | SYSTOLIC BLOOD PRESSURE: 100 MMHG | DIASTOLIC BLOOD PRESSURE: 65 MMHG | TEMPERATURE: 98 F | HEART RATE: 83 BPM

## 2023-08-06 DIAGNOSIS — Z90.49 ACQUIRED ABSENCE OF OTHER SPECIFIED PARTS OF DIGESTIVE TRACT: Chronic | ICD-10-CM

## 2023-08-06 DIAGNOSIS — Z97.5 PRESENCE OF (INTRAUTERINE) CONTRACEPTIVE DEVICE: ICD-10-CM

## 2023-08-06 DIAGNOSIS — Z98.891 HISTORY OF UTERINE SCAR FROM PREVIOUS SURGERY: Chronic | ICD-10-CM

## 2023-08-06 DIAGNOSIS — Z90.49 ACQUIRED ABSENCE OF OTHER SPECIFIED PARTS OF DIGESTIVE TRACT: ICD-10-CM

## 2023-08-06 DIAGNOSIS — Z87.19 PERSONAL HISTORY OF OTHER DISEASES OF THE DIGESTIVE SYSTEM: ICD-10-CM

## 2023-08-06 DIAGNOSIS — R10.9 UNSPECIFIED ABDOMINAL PAIN: ICD-10-CM

## 2023-08-06 DIAGNOSIS — Z98.84 BARIATRIC SURGERY STATUS: Chronic | ICD-10-CM

## 2023-08-06 DIAGNOSIS — N83.201 UNSPECIFIED OVARIAN CYST, RIGHT SIDE: ICD-10-CM

## 2023-08-06 LAB
ALBUMIN SERPL ELPH-MCNC: 4.4 G/DL — SIGNIFICANT CHANGE UP (ref 3.5–5.2)
ALP SERPL-CCNC: 66 U/L — SIGNIFICANT CHANGE UP (ref 30–115)
ALT FLD-CCNC: 13 U/L — SIGNIFICANT CHANGE UP (ref 0–41)
ANION GAP SERPL CALC-SCNC: 12 MMOL/L — SIGNIFICANT CHANGE UP (ref 7–14)
APPEARANCE UR: CLEAR — SIGNIFICANT CHANGE UP
AST SERPL-CCNC: 18 U/L — SIGNIFICANT CHANGE UP (ref 0–41)
BACTERIA # UR AUTO: ABNORMAL /HPF
BASOPHILS # BLD AUTO: 0.04 K/UL — SIGNIFICANT CHANGE UP (ref 0–0.2)
BASOPHILS NFR BLD AUTO: 0.5 % — SIGNIFICANT CHANGE UP (ref 0–1)
BILIRUB DIRECT SERPL-MCNC: 0.2 MG/DL — SIGNIFICANT CHANGE UP (ref 0–0.3)
BILIRUB INDIRECT FLD-MCNC: 1.1 MG/DL — SIGNIFICANT CHANGE UP (ref 0.2–1.2)
BILIRUB SERPL-MCNC: 1.3 MG/DL — HIGH (ref 0.2–1.2)
BILIRUB UR-MCNC: NEGATIVE — SIGNIFICANT CHANGE UP
BUN SERPL-MCNC: 14 MG/DL — SIGNIFICANT CHANGE UP (ref 10–20)
CALCIUM SERPL-MCNC: 9.2 MG/DL — SIGNIFICANT CHANGE UP (ref 8.4–10.5)
CHLORIDE SERPL-SCNC: 104 MMOL/L — SIGNIFICANT CHANGE UP (ref 98–110)
CO2 SERPL-SCNC: 24 MMOL/L — SIGNIFICANT CHANGE UP (ref 17–32)
COLOR SPEC: YELLOW — SIGNIFICANT CHANGE UP
CREAT SERPL-MCNC: 0.7 MG/DL — SIGNIFICANT CHANGE UP (ref 0.7–1.5)
DIFF PNL FLD: ABNORMAL
EGFR: 111 ML/MIN/1.73M2 — SIGNIFICANT CHANGE UP
EOSINOPHIL # BLD AUTO: 0.08 K/UL — SIGNIFICANT CHANGE UP (ref 0–0.7)
EOSINOPHIL NFR BLD AUTO: 1.1 % — SIGNIFICANT CHANGE UP (ref 0–8)
EPI CELLS # UR: ABNORMAL /HPF (ref 0–5)
GLUCOSE SERPL-MCNC: 78 MG/DL — SIGNIFICANT CHANGE UP (ref 70–99)
GLUCOSE UR QL: NEGATIVE MG/DL — SIGNIFICANT CHANGE UP
HCG SERPL QL: NEGATIVE — SIGNIFICANT CHANGE UP
HCT VFR BLD CALC: 41.1 % — SIGNIFICANT CHANGE UP (ref 37–47)
HGB BLD-MCNC: 13.8 G/DL — SIGNIFICANT CHANGE UP (ref 12–16)
IMM GRANULOCYTES NFR BLD AUTO: 0.3 % — SIGNIFICANT CHANGE UP (ref 0.1–0.3)
KETONES UR-MCNC: NEGATIVE — SIGNIFICANT CHANGE UP
LACTATE SERPL-SCNC: 0.5 MMOL/L — LOW (ref 0.7–2)
LEUKOCYTE ESTERASE UR-ACNC: NEGATIVE — SIGNIFICANT CHANGE UP
LIDOCAIN IGE QN: 55 U/L — SIGNIFICANT CHANGE UP (ref 7–60)
LYMPHOCYTES # BLD AUTO: 2.6 K/UL — SIGNIFICANT CHANGE UP (ref 1.2–3.4)
LYMPHOCYTES # BLD AUTO: 35.1 % — SIGNIFICANT CHANGE UP (ref 20.5–51.1)
MCHC RBC-ENTMCNC: 30.8 PG — SIGNIFICANT CHANGE UP (ref 27–31)
MCHC RBC-ENTMCNC: 33.6 G/DL — SIGNIFICANT CHANGE UP (ref 32–37)
MCV RBC AUTO: 91.7 FL — SIGNIFICANT CHANGE UP (ref 81–99)
MONOCYTES # BLD AUTO: 0.47 K/UL — SIGNIFICANT CHANGE UP (ref 0.1–0.6)
MONOCYTES NFR BLD AUTO: 6.4 % — SIGNIFICANT CHANGE UP (ref 1.7–9.3)
NEUTROPHILS # BLD AUTO: 4.19 K/UL — SIGNIFICANT CHANGE UP (ref 1.4–6.5)
NEUTROPHILS NFR BLD AUTO: 56.6 % — SIGNIFICANT CHANGE UP (ref 42.2–75.2)
NITRITE UR-MCNC: NEGATIVE — SIGNIFICANT CHANGE UP
NRBC # BLD: 0 /100 WBCS — SIGNIFICANT CHANGE UP (ref 0–0)
PH UR: 7 — SIGNIFICANT CHANGE UP (ref 5–8)
PLATELET # BLD AUTO: 375 K/UL — SIGNIFICANT CHANGE UP (ref 130–400)
PMV BLD: 9.8 FL — SIGNIFICANT CHANGE UP (ref 7.4–10.4)
POTASSIUM SERPL-MCNC: 4.2 MMOL/L — SIGNIFICANT CHANGE UP (ref 3.5–5)
POTASSIUM SERPL-SCNC: 4.2 MMOL/L — SIGNIFICANT CHANGE UP (ref 3.5–5)
PROT SERPL-MCNC: 7.3 G/DL — SIGNIFICANT CHANGE UP (ref 6–8)
PROT UR-MCNC: NEGATIVE MG/DL — SIGNIFICANT CHANGE UP
RBC # BLD: 4.48 M/UL — SIGNIFICANT CHANGE UP (ref 4.2–5.4)
RBC # FLD: 11.9 % — SIGNIFICANT CHANGE UP (ref 11.5–14.5)
RBC CASTS # UR COMP ASSIST: SIGNIFICANT CHANGE UP /HPF (ref 0–4)
SODIUM SERPL-SCNC: 140 MMOL/L — SIGNIFICANT CHANGE UP (ref 135–146)
SP GR SPEC: 1.01 — SIGNIFICANT CHANGE UP (ref 1.01–1.03)
TROPONIN T SERPL-MCNC: <0.01 NG/ML — SIGNIFICANT CHANGE UP
UROBILINOGEN FLD QL: 0.2 MG/DL — SIGNIFICANT CHANGE UP
WBC # BLD: 7.4 K/UL — SIGNIFICANT CHANGE UP (ref 4.8–10.8)
WBC # FLD AUTO: 7.4 K/UL — SIGNIFICANT CHANGE UP (ref 4.8–10.8)
WBC UR QL: SIGNIFICANT CHANGE UP /HPF (ref 0–5)

## 2023-08-06 PROCEDURE — 85025 COMPLETE CBC W/AUTO DIFF WBC: CPT

## 2023-08-06 PROCEDURE — 87086 URINE CULTURE/COLONY COUNT: CPT

## 2023-08-06 PROCEDURE — 36415 COLL VENOUS BLD VENIPUNCTURE: CPT

## 2023-08-06 PROCEDURE — 74177 CT ABD & PELVIS W/CONTRAST: CPT | Mod: 26,MG

## 2023-08-06 PROCEDURE — 99284 EMERGENCY DEPT VISIT MOD MDM: CPT

## 2023-08-06 PROCEDURE — 84484 ASSAY OF TROPONIN QUANT: CPT

## 2023-08-06 PROCEDURE — 80076 HEPATIC FUNCTION PANEL: CPT

## 2023-08-06 PROCEDURE — 93010 ELECTROCARDIOGRAM REPORT: CPT | Mod: 76

## 2023-08-06 PROCEDURE — 99285 EMERGENCY DEPT VISIT HI MDM: CPT | Mod: 25

## 2023-08-06 PROCEDURE — 93005 ELECTROCARDIOGRAM TRACING: CPT

## 2023-08-06 PROCEDURE — 81001 URINALYSIS AUTO W/SCOPE: CPT

## 2023-08-06 PROCEDURE — G1004: CPT

## 2023-08-06 PROCEDURE — 83605 ASSAY OF LACTIC ACID: CPT

## 2023-08-06 PROCEDURE — 80048 BASIC METABOLIC PNL TOTAL CA: CPT

## 2023-08-06 PROCEDURE — 83690 ASSAY OF LIPASE: CPT

## 2023-08-06 PROCEDURE — 96374 THER/PROPH/DIAG INJ IV PUSH: CPT | Mod: XU

## 2023-08-06 PROCEDURE — 84703 CHORIONIC GONADOTROPIN ASSAY: CPT

## 2023-08-06 PROCEDURE — 74177 CT ABD & PELVIS W/CONTRAST: CPT | Mod: MG

## 2023-08-06 RX ORDER — FAMOTIDINE 10 MG/ML
20 INJECTION INTRAVENOUS ONCE
Refills: 0 | Status: COMPLETED | OUTPATIENT
Start: 2023-08-06 | End: 2023-08-06

## 2023-08-06 RX ORDER — DIATRIZOATE MEGLUMINE 180 MG/ML
30 INJECTION, SOLUTION INTRAVESICAL ONCE
Refills: 0 | Status: COMPLETED | OUTPATIENT
Start: 2023-08-06 | End: 2023-08-06

## 2023-08-06 RX ORDER — OXYCODONE AND ACETAMINOPHEN 5; 325 MG/1; MG/1
1 TABLET ORAL ONCE
Refills: 0 | Status: DISCONTINUED | OUTPATIENT
Start: 2023-08-06 | End: 2023-08-06

## 2023-08-06 RX ORDER — SODIUM CHLORIDE 9 MG/ML
2000 INJECTION, SOLUTION INTRAVENOUS ONCE
Refills: 0 | Status: COMPLETED | OUTPATIENT
Start: 2023-08-06 | End: 2023-08-06

## 2023-08-06 RX ADMIN — FAMOTIDINE 20 MILLIGRAM(S): 10 INJECTION INTRAVENOUS at 12:07

## 2023-08-06 RX ADMIN — OXYCODONE AND ACETAMINOPHEN 1 TABLET(S): 5; 325 TABLET ORAL at 14:00

## 2023-08-06 RX ADMIN — SODIUM CHLORIDE 2000 MILLILITER(S): 9 INJECTION, SOLUTION INTRAVENOUS at 12:06

## 2023-08-06 RX ADMIN — DIATRIZOATE MEGLUMINE 30 MILLILITER(S): 180 INJECTION, SOLUTION INTRAVESICAL at 12:07

## 2023-08-06 NOTE — ED ADULT TRIAGE NOTE - NS ED NURSE DIRECT TO ROOM YN
[FreeTextEntry1] : A/P:\par \par Patient is doing well s/p recent TCAR. Incision is well healed without any concern for infection. Continue with antiplatelets and statin therapy. Follow up in one month for post-op carotid ultrasound with Dr. Grissom. \par \par Patient's wife requested to also see Dr. Grissom today. She was advised that there would be a wait time of 10-15 minutes. Patient's wife did not want to wait. Patient reassured that next follow up with be scheduled with Dr. Grissom. They also requested op note, will mail to patient's residence. 
Yes

## 2023-08-06 NOTE — ED PROVIDER NOTE - OBJECTIVE STATEMENT
Patient is a 42-year-old female history of cholelithiasis status postcholecystectomy 2013, gastric bypass 2009, GI bleed here for evaluation of upper abdominal pain that began earlier this morning 2 hours after eating.  Patient states pain has been constant since then which prompted visit.  Patient had similar episode 1 year ago where she had normal CAT scan and ultrasound, outpatient MRCP showed pancreatic lesion that was further tested and found to be benign as per patient.  Patient denies nausea, vomiting, diarrhea, fever, chills, chest pain, shortness of breath, dysuria, hematuria

## 2023-08-06 NOTE — ED PROVIDER NOTE - PHYSICAL EXAMINATION
VITAL SIGNS: I have reviewed nursing notes and confirm.  CONSTITUTIONAL: Well-developed; well-nourished  SKIN: skin exam is warm and dry, no acute rash.    HEAD: Normocephalic; atraumatic.  EYES: conjunctiva and sclera clear.  ENT: No nasal discharge; airway clear.  CARD: S1, S2 normal; no murmurs, gallops, or rubs. Regular rate and rhythm.   RESP: No wheezes, rales or rhonchi.  ABD: TTP upper quadrants Normal bowel sounds; soft; non-distended  EXT: Normal ROM.  No clubbing, cyanosis or edema.   NEURO: Alert, oriented, grossly unremarkable

## 2023-08-06 NOTE — ED PROVIDER NOTE - PATIENT PORTAL LINK FT
You can access the FollowMyHealth Patient Portal offered by [facility] by registering at the following website: [email or web address]. By joining [employer]’s FollowMyHealth portal, you will also be able to view your health information using other applications (apps) compatible with our system.

## 2023-08-06 NOTE — ED PROVIDER NOTE - CLINICAL SUMMARY MEDICAL DECISION MAKING FREE TEXT BOX
patient given IV fluids p.o. pain medicine at her request given her history we obtained labs which are normal at this time in addition we obtained CT scan which is negative patient found to have a crenated left-sided ovarian cyst with an IUD in place this pain is not consistent with ovarian torsion as it has improved at this time patient has a scheduled follow-up evaluation with her GI doctor in the 48 hours we discussed indications to return advised to follow-up low threshold for return

## 2023-08-06 NOTE — ED PROVIDER NOTE - ATTENDING APP SHARED VISIT CONTRIBUTION OF CARE
I have personally performed a history and physical exam on this patient and personally directed the management of the patient. Patient is a 42-year-old female presents for evaluation of abdominal pain she has a history of cholecystitis status postcholecystectomy 2013 as well as gastric bypass 2000 nausea presents for sudden onset abdominal pain described as cramping moderate in nature no fevers no chills no vomiting shortly after eating at 2 AM while working she denies any dysuria hesitancy or frequency    On physical exam patient is normocephalic atraumatic pupils equal round react light accommodation extraocular muscles intact oropharynx clear chest clear auscultation bilaterally abdomen soft nontender nondistended bowel sounds positive no guarding rebound extremities full range of motion no focal deficits radial pulse 2+ pedal pulse 2+     patient given IV fluids p.o. pain medicine at her request given her history we obtained labs which are normal at this time in addition we obtained CT scan which is negative patient found to have a crenated left-sided ovarian cyst with an IUD in place this pain is not consistent with ovarian torsion as it has improved at this time patient has a scheduled follow-up evaluation with her GI doctor in the 48 hours we discussed indications to return advised to follow-up low threshold for return

## 2023-08-06 NOTE — ED ADULT NURSE NOTE - NSFALLUNIVINTERV_ED_ALL_ED
Bed/Stretcher in lowest position, wheels locked, appropriate side rails in place/Call bell, personal items and telephone in reach/Instruct patient to call for assistance before getting out of bed/chair/stretcher/Non-slip footwear applied when patient is off stretcher/[place] to call system/Physically safe environment - no spills, clutter or unnecessary equipment/Purposeful proactive rounding/Room/bathroom lighting operational, light cord in reach

## 2023-08-07 LAB
CULTURE RESULTS: SIGNIFICANT CHANGE UP
SPECIMEN SOURCE: SIGNIFICANT CHANGE UP

## 2024-05-31 NOTE — ED PROVIDER NOTE - CARE PLAN
[facility] Hospitalist Progress Note        Between 7AM-7PM please message me via Epic Secure Chat.  After 7PM please page Nocturnist on call.        Assessment/Plan     #A fib w/ RVR  - appreciate cardiology  - cardioverted in ER  - continue amioConchaquis    #AoC anemia  - monitor, suspect drop from infection, fluids, blood draws    #sepsis rule out  - blood cx NGTD  - urine cx enteric bacilli  - can continue CTX for now    #hypoK/hypoMg  - replaced  - monitor    #alcohol use disorder  - reported no drinking since most recent discharge  - monitor for signs or sx of withdrawal  - continue thiamine, folic acid    #hx COPD not in exacerbation, not on O2  - continue inhalers    #Elevated bili, LFTs, thrombocytopenia  - suspected from liver disease and alcohol use  - needs GI follow up    #falls  #deconditioning  - PT/OT    #malnutrition reported  - ensure ordered    DVT Prophylaxis:  Eliquis      Discharge Planning: Home w/ HHC on DC, anticipate DC to home in next 24-48h    I personally examined the patient and reviewed chart.  Plan of care was discussed with patient, all questions answered.    Total time spent: At least 38 minutes, providing counseling or in coordination of care. Total time on this day of visit includes record and documentation review before and after visit including documentation and time not explicitly included on EMR time stamp.      Subjective     Svitlana Perry is a 72 y.o. female on day 2 of admission presenting with Atrial fibrillation with RVR (Multi).    NAEON. Overall stable, does not complain of new acute issues. Feels good overall has been walking around room. Hopeful for DC home tomorrow.    Review of Systems   Constitutional:  Negative for fever.   Respiratory:  Negative for shortness of breath.    Cardiovascular:  Negative for chest pain.   Gastrointestinal:  Negative for abdominal distention, abdominal pain and vomiting.       Objective     Physical Exam  Constitutional:       General:  "She is not in acute distress.  Cardiovascular:      Rate and Rhythm: Normal rate and regular rhythm.   Pulmonary:      Effort: Pulmonary effort is normal.      Breath sounds: Normal breath sounds.   Abdominal:      General: There is no distension.      Palpations: Abdomen is soft.   Neurological:      Mental Status: She is alert. Mental status is at baseline.         Last Recorded Vitals  Blood pressure 106/62, pulse 90, temperature 37.4 °C (99.3 °F), temperature source Temporal, resp. rate 19, height 1.651 m (5' 5\"), weight 59.9 kg (132 lb), SpO2 95%.  Intake/Output last 3 Shifts:  I/O last 3 completed shifts:  In: 1412.1 (23.5 mL/kg) [P.O.:720; I.V.:692.1 (11.5 mL/kg)]  Out: - (0 mL/kg)   Weight: 60.1 kg     Relevant Results  Results for orders placed or performed during the hospital encounter of 05/29/24 (from the past 24 hour(s))   Transthoracic Echo (TTE) Complete   Result Value Ref Range    BSA 1.66 m2       Imaging Results  ECG 12 lead    Result Date: 5/29/2024  Sinus tachycardia with occasional Premature ventricular complexes Nonspecific T wave abnormality Abnormal ECG When compared with ECG of 29-MAY-2024 12:34, Sinus rhythm has replaced Atrial fibrillation Vent. rate has decreased BY  61 BPM ST no longer depressed in Anterior leads Nonspecific T wave abnormality has replaced inverted T waves in Inferior leads Nonspecific T wave abnormality has replaced inverted T waves in Anterolateral leads See ED provider note for full interpretation and clinical correlation Confirmed by Amber Roth ([id number]) on 5/29/2024 6:19:14 PM    CT cervical spine wo IV contrast    Result Date: 5/29/2024  Interpreted By:  Schoenberger, Joseph, STUDY: CT CERVICAL SPINE WO IV CONTRAST;  5/29/2024 2:51 pm   INDICATION: Signs/Symptoms:fall.   COMPARISON: None.   ACCESSION NUMBER(S): [id number]   ORDERING CLINICIAN: LOLY KNOX   TECHNIQUE: Axial CT images of the cervical spine are obtained. Axial, coronal and sagittal " reconstructions are provided for review.   FINDINGS:     Fractures: There is no evidence for an acute fracture of the cervical spine.   Vertebral Alignment: There is straightening of the normal cervical lordotic curve. The sagittal alignment of the vertebra is otherwise maintained.   Craniocervical Junction: The odontoid process and craniocervical junction are intact.   Vertebrae/Disc Spaces:  All cervical vertebra are normal in height without vertebral body fracture. There is moderate degenerative narrowing of the C5 disc with mild narrowing of the C6 disc. Other discs are maintained in height.   Prevertebral/Paraspinal Soft Tissues: The prevertebral and paraspinal soft tissues are unremarkable.   Posterior elements are aligned and intact other than some mild degenerative changes. There is no acute fracture or subluxation.       Degenerative changes without acute fracture or subluxation.   MACRO: None   Signed by: Joseph Schoenberger 5/29/2024 3:27 PM Dictation workstation:   [id number]    CT head wo IV contrast    Result Date: 5/29/2024  Interpreted By:  Schoenberger, Joseph, STUDY: CT HEAD WO IV CONTRAST;  5/29/2024 2:51 pm   INDICATION: Signs/Symptoms:fall.   COMPARISON: None.   ACCESSION NUMBER(S): [id number]   ORDERING CLINICIAN: LOLY KNOX   TECHNIQUE: Noncontrast axial CT scan of head was performed. Angled reformats in brain and bone windows were generated. The images were reviewed in bone, brain, blood and soft tissue windows.   FINDINGS: CSF Spaces: Enlarged due to parenchymal volume loss. Normal configuration with intact basal cisterns. There is no extraaxial fluid collection.   Parenchyma: Periventricular confluent deep white matter hypoattenuation consistent with small vessel ischemic white matter demyelination. The grey-white differentiation is intact. There is no mass effect or midline shift.  There is no intracranial hemorrhage.   Calvarium: The calvarium is unremarkable.   Paranasal  sinuses and mastoids: Visualized paranasal sinuses and mastoids are clear.       Atrophy and chronic ischemic white matter demyelination without acute intracranial finding.   No evidence of intracranial hemorrhage or displaced skull fracture.   MACRO: None   Signed by: Joseph Schoenberger 5/29/2024 3:25 PM Dictation workstation:   [id number]    ECG 12 lead    Result Date: 5/29/2024  Atrial fibrillation with rapid ventricular response ST & T wave abnormality, consider inferior ischemia ST & T wave abnormality, consider anterolateral ischemia Abnormal ECG When compared with ECG of 23-MAY-2024 12:06, Atrial fibrillation has replaced Sinus rhythm Vent. rate has increased BY  58 BPM Inverted T waves have replaced nonspecific T wave abnormality in Inferior leads Inverted T waves have replaced nonspecific T wave abnormality in Lateral leads See ED provider note for full interpretation and clinical correlation Confirmed by Amber Roth ([id number]) on 5/29/2024 3:05:44 PM    XR pelvis 1-2 views    Result Date: 5/29/2024  STUDY: Pelvis Radiographs; 05/29/2024 1:52PM INDICATION: Status post fall. COMPARISON: XR Pelvis 05/25/2020, CT Abdomen and Pelvis 05/25/2020. ACCESSION NUMBER(S): [id number] ORDERING CLINICIAN: LOLY KNOX TECHNIQUE:  One view(s) of the pelvis. FINDINGS:  The pelvic ring is intact.  There is no acute fracture.      No acute osseous abnormality. Moderate stool in the colon. Signed by Timbo Ghosh MD    XR chest 1 view    Result Date: 5/29/2024  STUDY: Chest Radiograph;  05/29/2024 1:52PM INDICATION: Status post fall. COMPARISON: XR Chest 05/23/2024 ACCESSION NUMBER(S): [id number] ORDERING CLINICIAN: LOLY KNOX TECHNIQUE:  Frontal chest was obtained at 13:51 hours. FINDINGS: CARDIOMEDIASTINAL SILHOUETTE: Cardiomediastinal silhouette is normal in size and configuration.  LUNGS: Lungs are clear.  ABDOMEN: No remarkable upper abdominal findings.  BONES: No acute osseous changes.    No acute  process. Signed by Timbo Ghosh MD      Medications:  [START ON 6/10/2024] amiodarone, 200 mg, oral, Every other day  amiodarone, 400 mg, oral, BID  apixaban, 5 mg, oral, BID  cefTRIAXone, 1 g, intravenous, q24h  tiotropium, 2 puff, inhalation, Daily   And  fluticasone furoate-vilanteroL, 1 puff, inhalation, Daily  folic acid, 1 mg, oral, Daily  multivitamin with minerals, 1 tablet, oral, Daily  pantoprazole, 40 mg, oral, Daily before breakfast  perflutren lipid microspheres, 0.5-10 mL of dilution, intravenous, Once in imaging  [START ON 6/1/2024] thiamine, 100 mg, oral, Daily       PRN medications: ipratropium-albuteroL, lidocaine       aGrfield Gibbons MD  [facility]   Principal Discharge DX:	GI bleed  Secondary Diagnosis:	Symptomatic anemia

## 2024-11-12 ENCOUNTER — NON-APPOINTMENT (OUTPATIENT)
Age: 44
End: 2024-11-12

## 2025-01-21 ENCOUNTER — NON-APPOINTMENT (OUTPATIENT)
Age: 45
End: 2025-01-21

## 2025-02-17 ENCOUNTER — EMERGENCY (EMERGENCY)
Facility: HOSPITAL | Age: 45
LOS: 0 days | Discharge: HOME | End: 2025-02-17
Attending: EMERGENCY MEDICINE
Payer: COMMERCIAL

## 2025-02-17 VITALS
RESPIRATION RATE: 18 BRPM | WEIGHT: 139.99 LBS | TEMPERATURE: 98 F | DIASTOLIC BLOOD PRESSURE: 66 MMHG | OXYGEN SATURATION: 99 % | HEART RATE: 90 BPM | SYSTOLIC BLOOD PRESSURE: 93 MMHG

## 2025-02-17 DIAGNOSIS — R10.13 EPIGASTRIC PAIN: ICD-10-CM

## 2025-02-17 DIAGNOSIS — Z98.84 BARIATRIC SURGERY STATUS: Chronic | ICD-10-CM

## 2025-02-17 DIAGNOSIS — Z98.891 HISTORY OF UTERINE SCAR FROM PREVIOUS SURGERY: Chronic | ICD-10-CM

## 2025-02-17 DIAGNOSIS — R10.33 PERIUMBILICAL PAIN: ICD-10-CM

## 2025-02-17 DIAGNOSIS — Z98.84 BARIATRIC SURGERY STATUS: ICD-10-CM

## 2025-02-17 DIAGNOSIS — Z90.49 ACQUIRED ABSENCE OF OTHER SPECIFIED PARTS OF DIGESTIVE TRACT: Chronic | ICD-10-CM

## 2025-02-17 LAB
ALBUMIN SERPL ELPH-MCNC: 4.2 G/DL — SIGNIFICANT CHANGE UP (ref 3.5–5.2)
ALP SERPL-CCNC: 108 U/L — SIGNIFICANT CHANGE UP (ref 30–115)
ALT FLD-CCNC: 14 U/L — SIGNIFICANT CHANGE UP (ref 0–41)
ANION GAP SERPL CALC-SCNC: 11 MMOL/L — SIGNIFICANT CHANGE UP (ref 7–14)
APPEARANCE UR: CLEAR — SIGNIFICANT CHANGE UP
AST SERPL-CCNC: 22 U/L — SIGNIFICANT CHANGE UP (ref 0–41)
BASOPHILS # BLD AUTO: 0.04 K/UL — SIGNIFICANT CHANGE UP (ref 0–0.2)
BASOPHILS NFR BLD AUTO: 0.5 % — SIGNIFICANT CHANGE UP (ref 0–1)
BILIRUB SERPL-MCNC: 0.6 MG/DL — SIGNIFICANT CHANGE UP (ref 0.2–1.2)
BILIRUB UR-MCNC: NEGATIVE — SIGNIFICANT CHANGE UP
BUN SERPL-MCNC: 7 MG/DL — LOW (ref 10–20)
CALCIUM SERPL-MCNC: 8.8 MG/DL — SIGNIFICANT CHANGE UP (ref 8.4–10.5)
CHLORIDE SERPL-SCNC: 105 MMOL/L — SIGNIFICANT CHANGE UP (ref 98–110)
CO2 SERPL-SCNC: 23 MMOL/L — SIGNIFICANT CHANGE UP (ref 17–32)
COLOR SPEC: YELLOW — SIGNIFICANT CHANGE UP
CREAT SERPL-MCNC: 0.5 MG/DL — LOW (ref 0.7–1.5)
DIFF PNL FLD: NEGATIVE — SIGNIFICANT CHANGE UP
EGFR: 119 ML/MIN/1.73M2 — SIGNIFICANT CHANGE UP
EOSINOPHIL # BLD AUTO: 0.11 K/UL — SIGNIFICANT CHANGE UP (ref 0–0.7)
EOSINOPHIL NFR BLD AUTO: 1.4 % — SIGNIFICANT CHANGE UP (ref 0–8)
GLUCOSE SERPL-MCNC: 78 MG/DL — SIGNIFICANT CHANGE UP (ref 70–99)
GLUCOSE UR QL: NEGATIVE MG/DL — SIGNIFICANT CHANGE UP
HCG SERPL QL: NEGATIVE — SIGNIFICANT CHANGE UP
HCT VFR BLD CALC: 36.8 % — LOW (ref 37–47)
HGB BLD-MCNC: 12.5 G/DL — SIGNIFICANT CHANGE UP (ref 12–16)
IMM GRANULOCYTES NFR BLD AUTO: 0.1 % — SIGNIFICANT CHANGE UP (ref 0.1–0.3)
KETONES UR-MCNC: ABNORMAL MG/DL
LACTATE SERPL-SCNC: 0.7 MMOL/L — SIGNIFICANT CHANGE UP (ref 0.7–2)
LEUKOCYTE ESTERASE UR-ACNC: NEGATIVE — SIGNIFICANT CHANGE UP
LIDOCAIN IGE QN: 24 U/L — SIGNIFICANT CHANGE UP (ref 7–60)
LYMPHOCYTES # BLD AUTO: 2.56 K/UL — SIGNIFICANT CHANGE UP (ref 1.2–3.4)
LYMPHOCYTES # BLD AUTO: 33.4 % — SIGNIFICANT CHANGE UP (ref 20.5–51.1)
MCHC RBC-ENTMCNC: 30.2 PG — SIGNIFICANT CHANGE UP (ref 27–31)
MCHC RBC-ENTMCNC: 34 G/DL — SIGNIFICANT CHANGE UP (ref 32–37)
MCV RBC AUTO: 88.9 FL — SIGNIFICANT CHANGE UP (ref 81–99)
MONOCYTES # BLD AUTO: 0.74 K/UL — HIGH (ref 0.1–0.6)
MONOCYTES NFR BLD AUTO: 9.6 % — HIGH (ref 1.7–9.3)
NEUTROPHILS # BLD AUTO: 4.21 K/UL — SIGNIFICANT CHANGE UP (ref 1.4–6.5)
NEUTROPHILS NFR BLD AUTO: 55 % — SIGNIFICANT CHANGE UP (ref 42.2–75.2)
NITRITE UR-MCNC: NEGATIVE — SIGNIFICANT CHANGE UP
NRBC BLD AUTO-RTO: 0 /100 WBCS — SIGNIFICANT CHANGE UP (ref 0–0)
PH UR: 6.5 — SIGNIFICANT CHANGE UP (ref 5–8)
PLATELET # BLD AUTO: 327 K/UL — SIGNIFICANT CHANGE UP (ref 130–400)
PMV BLD: 9.4 FL — SIGNIFICANT CHANGE UP (ref 7.4–10.4)
POTASSIUM SERPL-MCNC: 4.2 MMOL/L — SIGNIFICANT CHANGE UP (ref 3.5–5)
POTASSIUM SERPL-SCNC: 4.2 MMOL/L — SIGNIFICANT CHANGE UP (ref 3.5–5)
PROT SERPL-MCNC: 6.8 G/DL — SIGNIFICANT CHANGE UP (ref 6–8)
PROT UR-MCNC: NEGATIVE MG/DL — SIGNIFICANT CHANGE UP
RBC # BLD: 4.14 M/UL — LOW (ref 4.2–5.4)
RBC # FLD: 15.9 % — HIGH (ref 11.5–14.5)
SODIUM SERPL-SCNC: 139 MMOL/L — SIGNIFICANT CHANGE UP (ref 135–146)
SP GR SPEC: 1.01 — SIGNIFICANT CHANGE UP (ref 1–1.03)
UROBILINOGEN FLD QL: 0.2 MG/DL — SIGNIFICANT CHANGE UP (ref 0.2–1)
WBC # BLD: 7.67 K/UL — SIGNIFICANT CHANGE UP (ref 4.8–10.8)
WBC # FLD AUTO: 7.67 K/UL — SIGNIFICANT CHANGE UP (ref 4.8–10.8)

## 2025-02-17 PROCEDURE — 99284 EMERGENCY DEPT VISIT MOD MDM: CPT | Mod: 25

## 2025-02-17 PROCEDURE — 80053 COMPREHEN METABOLIC PANEL: CPT

## 2025-02-17 PROCEDURE — 96375 TX/PRO/DX INJ NEW DRUG ADDON: CPT

## 2025-02-17 PROCEDURE — 74177 CT ABD & PELVIS W/CONTRAST: CPT | Mod: 26

## 2025-02-17 PROCEDURE — 81025 URINE PREGNANCY TEST: CPT

## 2025-02-17 PROCEDURE — 83690 ASSAY OF LIPASE: CPT

## 2025-02-17 PROCEDURE — 85025 COMPLETE CBC W/AUTO DIFF WBC: CPT

## 2025-02-17 PROCEDURE — 84703 CHORIONIC GONADOTROPIN ASSAY: CPT

## 2025-02-17 PROCEDURE — 81003 URINALYSIS AUTO W/O SCOPE: CPT

## 2025-02-17 PROCEDURE — 99285 EMERGENCY DEPT VISIT HI MDM: CPT

## 2025-02-17 PROCEDURE — 83605 ASSAY OF LACTIC ACID: CPT

## 2025-02-17 PROCEDURE — 74177 CT ABD & PELVIS W/CONTRAST: CPT | Mod: MC

## 2025-02-17 PROCEDURE — 96374 THER/PROPH/DIAG INJ IV PUSH: CPT | Mod: XU

## 2025-02-17 PROCEDURE — 36415 COLL VENOUS BLD VENIPUNCTURE: CPT

## 2025-02-17 RX ORDER — FAMOTIDINE 10 MG/ML
20 INJECTION INTRAVENOUS ONCE
Refills: 0 | Status: COMPLETED | OUTPATIENT
Start: 2025-02-17 | End: 2025-02-17

## 2025-02-17 RX ORDER — MORPHINE SULFATE 60 MG/1
4 TABLET, FILM COATED, EXTENDED RELEASE ORAL ONCE
Refills: 0 | Status: DISCONTINUED | OUTPATIENT
Start: 2025-02-17 | End: 2025-02-17

## 2025-02-17 RX ORDER — BACTERIOSTATIC SODIUM CHLORIDE 0.9 %
1000 VIAL (ML) INJECTION ONCE
Refills: 0 | Status: COMPLETED | OUTPATIENT
Start: 2025-02-17 | End: 2025-02-17

## 2025-02-17 RX ORDER — KETOROLAC TROMETHAMINE 10 MG
30 TABLET ORAL ONCE
Refills: 0 | Status: DISCONTINUED | OUTPATIENT
Start: 2025-02-17 | End: 2025-02-17

## 2025-02-17 RX ORDER — IOHEXOL 350 MG/ML
30 INJECTION, SOLUTION INTRAVENOUS ONCE
Refills: 0 | Status: COMPLETED | OUTPATIENT
Start: 2025-02-17 | End: 2025-02-17

## 2025-02-17 RX ADMIN — Medication 1000 MILLILITER(S): at 12:51

## 2025-02-17 RX ADMIN — IOHEXOL 30 MILLILITER(S): 350 INJECTION, SOLUTION INTRAVENOUS at 12:51

## 2025-02-17 RX ADMIN — FAMOTIDINE 20 MILLIGRAM(S): 10 INJECTION INTRAVENOUS at 12:51

## 2025-02-17 RX ADMIN — Medication 30 MILLIGRAM(S): at 15:31

## 2025-02-17 RX ADMIN — Medication 1000 MILLILITER(S): at 15:03

## 2025-02-17 RX ADMIN — MORPHINE SULFATE 4 MILLIGRAM(S): 60 TABLET, FILM COATED, EXTENDED RELEASE ORAL at 15:03

## 2025-02-17 NOTE — ED PROVIDER NOTE - PHYSICAL EXAMINATION
CONST: Well appearing in NAD  EYES: PERRL, EOMI, Sclera and conjunctiva clear.   ENT: No nasal discharge. Oropharynx normal appearing  NECK: Non-tender, no meningeal signs. normal ROM. supple   CARD: Normal S1 S2; Normal rate and rhythm  RESP: Equal BS B/L, No wheezes, rhonchi or rales. No distress  GI: Soft, epigastric and periumbilical abd tenderness,  non-distended. no cva tenderness. normal BS  MS: Normal ROM in all extremities. No midline spinal tenderness. pulses 2 +. no calf tenderness or swelling  SKIN: Warm, dry, no acute rashes. Good turgor  NEURO: A&Ox3, No focal deficits.

## 2025-02-17 NOTE — ED PROVIDER NOTE - PATIENT PORTAL LINK FT
You can access the FollowMyHealth Patient Portal offered by [facility] by registering at the following website: [email or web address]. By joining [employer]’s FollowMyHealth portal, you will also be able to view your health information using other applications (apps) compatible with our system.

## 2025-02-17 NOTE — ED PROVIDER NOTE - [facility]BU[facility]ACILITY
[facility] inability to meet needs po 2/2 decreased intake rt disease condition/obstructions

## 2025-02-17 NOTE — ED PROVIDER NOTE - OBJECTIVE STATEMENT
44 year old female with pmhx of gastric bypass and cholecystectomy presents to ed with periumbilical abd pain since 3 am. Pt is sharp and constant, no alleviating or aggravating factors. no nausea, vomiting, diarrhea, fever, chills, urinary symptoms, or vaginal bleeding. lmp 1 week ago

## 2025-02-17 NOTE — ED PROVIDER NOTE - CLINICAL SUMMARY MEDICAL DECISION MAKING FREE TEXT BOX
44 year old female with pmhx of gastric bypass and cholecystectomy presents with c/o abd pain that started at about 0300 today.  Denies vomiting or blood in stool.  No change in diet.  States has had same pain 2 times before with last in 2023.  States that a cause was never elucidated but toradol relieved pain in the past.  Neg f/c.  L:CTAB, CV:rrr, s1s2, ABD: +periumbilical and epigastric ttp, neg r/g, nl bs.  Neg lower abd ttp, neg cva ttp.  Ct revealed nad, pt with some relief with tx in the ED.  She will f/u with GI as an outpt.  Dr. Keith performed surgery but states the she hasn't followed with him in many years.  Patient was discharged from the ED. Verbal instructions were given, including instructions to return to ED immediately for any new, worsening, or concerning symptoms.  I also discussed the follow-up plan and post ED care.  Patient verbalized understanding to me. Written discharge instructions additionally given.